# Patient Record
Sex: FEMALE | Race: WHITE | Employment: UNEMPLOYED | URBAN - NONMETROPOLITAN AREA
[De-identification: names, ages, dates, MRNs, and addresses within clinical notes are randomized per-mention and may not be internally consistent; named-entity substitution may affect disease eponyms.]

---

## 2017-01-14 ENCOUNTER — TELEPHONE (OUTPATIENT)
Dept: FAMILY MEDICINE CLINIC | Facility: CLINIC | Age: 42
End: 2017-01-14

## 2017-01-17 RX ORDER — CARVEDILOL 25 MG/1
25 TABLET ORAL 2 TIMES DAILY WITH MEALS
Qty: 180 TABLET | Refills: 0 | Status: SHIPPED | OUTPATIENT
Start: 2017-01-17 | End: 2017-04-17

## 2017-01-17 NOTE — TELEPHONE ENCOUNTER
Fax request received from [address] [place] [address] requesting refill on Carvedilol 25mg. Take 1 tablet by mouth two times daily with meals.      Last office visit 5-17-16 110/80  Last refill 9-6-16 #180

## 2017-02-27 RX ORDER — LEVOTHYROXINE SODIUM 0.15 MG/1
150 TABLET ORAL
Qty: 90 TABLET | Refills: 0 | Status: SHIPPED | OUTPATIENT
Start: 2017-02-27 | End: 2017-05-31

## 2017-02-27 NOTE — TELEPHONE ENCOUNTER
Fax request received from [address] [place] [address] requesting refill Levothyroxine 150mcg.     Last office visit 5-17-16  Last refill 11-28-16 #90  Last TSH 1-6-16

## 2017-03-17 NOTE — TELEPHONE ENCOUNTER
The 2 scripts picked up were Norco and Avinza-   Patient stated that the other 2 had been sent to her pharmacy

## 2017-03-20 DIAGNOSIS — Z51.81 ENCOUNTER FOR THERAPEUTIC DRUG MONITORING: Primary | ICD-10-CM

## 2017-03-20 DIAGNOSIS — F32.5 MAJOR DEPRESSIVE DISORDER, SINGLE EPISODE, IN REMISSION (HCC): ICD-10-CM

## 2017-03-20 DIAGNOSIS — F31.31 BIPOLAR AFFECTIVE DISORDER, CURRENTLY DEPRESSED, MILD (HCC): ICD-10-CM

## 2017-03-21 ENCOUNTER — MED REC SCAN ONLY (OUTPATIENT)
Dept: FAMILY MEDICINE CLINIC | Facility: CLINIC | Age: 42
End: 2017-03-21

## 2017-03-27 NOTE — PROGRESS NOTES
Abigail Gallegos is a 42year old female. HPI:   Last visit 5/2016 with me. Last visit with Dr. Zaida Jackson 6/2016. Has not seen psychiatrist or other specialist. Here per my request for labs and assessment. Feels depression has worsened.  But still  Nanci Zelaya Take 1 tablet (500 mg total) by mouth daily. Disp: 90 tablet Rfl: 0   Tiotropium Bromide Monohydrate (SPIRIVA RESPIMAT) 2.5 MCG/ACT Inhalation Aero Soln Inhale 1 puff into the lungs daily.  Disp: 3 Inhaler Rfl: 1   SUMAtriptan Succinate (IMITREX) 100 MG Ora encourage psych follow up referrla put in    4. Major depressive disorder, single episode, in remission (HCC)  - latuda 90 mg daily  - referred to [facility] - to choices and help with psychitatrist     5.  Chronic obstructive pulmonary disease,

## 2017-03-28 ENCOUNTER — LAB ENCOUNTER (OUTPATIENT)
Dept: LAB | Age: 42
End: 2017-03-28
Attending: FAMILY MEDICINE
Payer: COMMERCIAL

## 2017-03-28 ENCOUNTER — OFFICE VISIT (OUTPATIENT)
Dept: FAMILY MEDICINE CLINIC | Facility: CLINIC | Age: 42
End: 2017-03-28

## 2017-03-28 VITALS
TEMPERATURE: 98 F | BODY MASS INDEX: 32 KG/M2 | SYSTOLIC BLOOD PRESSURE: 120 MMHG | DIASTOLIC BLOOD PRESSURE: 88 MMHG | WEIGHT: 190.5 LBS

## 2017-03-28 DIAGNOSIS — E55.9 VITAMIN D DEFICIENCY: ICD-10-CM

## 2017-03-28 DIAGNOSIS — J45.20 MILD INTERMITTENT ASTHMA WITHOUT COMPLICATION: ICD-10-CM

## 2017-03-28 DIAGNOSIS — F31.31 BIPOLAR AFFECTIVE DISORDER, CURRENTLY DEPRESSED, MILD (HCC): ICD-10-CM

## 2017-03-28 DIAGNOSIS — E03.9 ACQUIRED HYPOTHYROIDISM: ICD-10-CM

## 2017-03-28 DIAGNOSIS — I10 ESSENTIAL HYPERTENSION: ICD-10-CM

## 2017-03-28 DIAGNOSIS — F32.5 MAJOR DEPRESSIVE DISORDER, SINGLE EPISODE, IN REMISSION (HCC): ICD-10-CM

## 2017-03-28 DIAGNOSIS — M54.50 CHRONIC BILATERAL LOW BACK PAIN WITHOUT SCIATICA: ICD-10-CM

## 2017-03-28 DIAGNOSIS — G89.29 CHRONIC BILATERAL LOW BACK PAIN WITHOUT SCIATICA: ICD-10-CM

## 2017-03-28 DIAGNOSIS — Z79.899 ENCOUNTER FOR LONG-TERM (CURRENT) USE OF HIGH-RISK MEDICATION: ICD-10-CM

## 2017-03-28 DIAGNOSIS — M47.816 OSTEOARTHRITIS OF LUMBAR SPINE, UNSPECIFIED SPINAL OSTEOARTHRITIS COMPLICATION STATUS: ICD-10-CM

## 2017-03-28 DIAGNOSIS — F11.20 NARCOTIC DEPENDENCY, CONTINUOUS (HCC): ICD-10-CM

## 2017-03-28 DIAGNOSIS — E87.1 ACUTE HYPONATREMIA: ICD-10-CM

## 2017-03-28 DIAGNOSIS — J44.9 CHRONIC OBSTRUCTIVE PULMONARY DISEASE, UNSPECIFIED COPD TYPE (HCC): ICD-10-CM

## 2017-03-28 DIAGNOSIS — Z51.81 ENCOUNTER FOR THERAPEUTIC DRUG MONITORING: Primary | ICD-10-CM

## 2017-03-28 LAB
25-HYDROXYVITAMIN D (TOTAL): 8.4 NG/ML (ref 30–100)
ALBUMIN SERPL-MCNC: 3.7 G/DL (ref 3.5–4.8)
ALP LIVER SERPL-CCNC: 98 U/L (ref 37–98)
ALT SERPL-CCNC: 17 U/L (ref 14–54)
AST SERPL-CCNC: 11 U/L (ref 15–41)
BASOPHILS # BLD AUTO: 0.03 X10(3) UL (ref 0–0.1)
BASOPHILS NFR BLD AUTO: 0.3 %
BILIRUB SERPL-MCNC: 0.3 MG/DL (ref 0.1–2)
BUN BLD-MCNC: 9 MG/DL (ref 8–20)
CALCIUM BLD-MCNC: 8.8 MG/DL (ref 8.3–10.3)
CHLORIDE: 102 MMOL/L (ref 101–111)
CHOLEST SMN-MCNC: 209 MG/DL (ref ?–200)
CO2: 24 MMOL/L (ref 22–32)
CREAT BLD-MCNC: 0.71 MG/DL (ref 0.55–1.02)
EOSINOPHIL # BLD AUTO: 0.02 X10(3) UL (ref 0–0.3)
EOSINOPHIL NFR BLD AUTO: 0.2 %
ERYTHROCYTE [DISTWIDTH] IN BLOOD BY AUTOMATED COUNT: 12.9 % (ref 11.5–16)
GLUCOSE BLD-MCNC: 127 MG/DL (ref 70–99)
HCT VFR BLD AUTO: 49 % (ref 34–50)
HDLC SERPL-MCNC: 42 MG/DL (ref 45–?)
HDLC SERPL: 4.98 {RATIO} (ref ?–4.44)
HGB BLD-MCNC: 16.3 G/DL (ref 12–16)
IMMATURE GRANULOCYTE COUNT: 0.03 X10(3) UL (ref 0–1)
IMMATURE GRANULOCYTE RATIO %: 0.3 %
LDLC SERPL CALC-MCNC: 133 MG/DL (ref ?–130)
LYMPHOCYTES # BLD AUTO: 0.38 X10(3) UL (ref 0.9–4)
LYMPHOCYTES NFR BLD AUTO: 4.1 %
M PROTEIN MFR SERPL ELPH: 7.3 G/DL (ref 6.1–8.3)
MCH RBC QN AUTO: 31.3 PG (ref 27–33.2)
MCHC RBC AUTO-ENTMCNC: 33.3 G/DL (ref 31–37)
MCV RBC AUTO: 94.2 FL (ref 81–100)
MONOCYTES # BLD AUTO: 0.52 X10(3) UL (ref 0.1–0.6)
MONOCYTES NFR BLD AUTO: 5.7 %
NEUTROPHIL ABS PRELIM: 8.18 X10 (3) UL (ref 1.3–6.7)
NEUTROPHILS # BLD AUTO: 8.18 X10(3) UL (ref 1.3–6.7)
NEUTROPHILS NFR BLD AUTO: 89.4 %
NONHDLC SERPL-MCNC: 167 MG/DL (ref ?–130)
PLATELET # BLD AUTO: 328 10(3)UL (ref 150–450)
POTASSIUM SERPL-SCNC: 4 MMOL/L (ref 3.6–5.1)
RBC # BLD AUTO: 5.2 X10(6)UL (ref 3.8–5.1)
RED CELL DISTRIBUTION WIDTH-SD: 44.1 FL (ref 35.1–46.3)
SODIUM SERPL-SCNC: 135 MMOL/L (ref 136–144)
TRIGLYCERIDES: 170 MG/DL (ref ?–150)
TSI SER-ACNC: 1 MIU/ML (ref 0.35–5.5)
VLDL: 34 MG/DL (ref 5–40)
WBC # BLD AUTO: 9.2 X10(3) UL (ref 4–13)

## 2017-03-28 PROCEDURE — 85025 COMPLETE CBC W/AUTO DIFF WBC: CPT

## 2017-03-28 PROCEDURE — 80061 LIPID PANEL: CPT

## 2017-03-28 PROCEDURE — 80053 COMPREHEN METABOLIC PANEL: CPT

## 2017-03-28 PROCEDURE — 84443 ASSAY THYROID STIM HORMONE: CPT

## 2017-03-28 PROCEDURE — 82306 VITAMIN D 25 HYDROXY: CPT

## 2017-03-28 PROCEDURE — 36415 COLL VENOUS BLD VENIPUNCTURE: CPT

## 2017-03-28 PROCEDURE — 99214 OFFICE O/P EST MOD 30 MIN: CPT | Performed by: FAMILY MEDICINE

## 2017-03-31 DIAGNOSIS — E55.9 VITAMIN D DEFICIENCY: Primary | ICD-10-CM

## 2017-03-31 RX ORDER — ERGOCALCIFEROL 1.25 MG/1
50000 CAPSULE ORAL WEEKLY
Qty: 12 CAPSULE | Refills: 0 | Status: SHIPPED | OUTPATIENT
Start: 2017-03-31 | End: 2017-06-26

## 2017-04-17 RX ORDER — CARVEDILOL 25 MG/1
25 TABLET ORAL 2 TIMES DAILY WITH MEALS
Qty: 180 TABLET | Refills: 0 | Status: SHIPPED | OUTPATIENT
Start: 2017-04-17 | End: 2017-07-13

## 2017-05-31 RX ORDER — LEVOTHYROXINE SODIUM 0.15 MG/1
150 TABLET ORAL
Qty: 90 TABLET | Refills: 1 | Status: SHIPPED | OUTPATIENT
Start: 2017-05-31 | End: 2017-11-22

## 2017-06-26 DIAGNOSIS — E55.9 VITAMIN D DEFICIENCY: ICD-10-CM

## 2017-06-27 DIAGNOSIS — J44.9 CHRONIC OBSTRUCTIVE PULMONARY DISEASE, UNSPECIFIED COPD TYPE (HCC): ICD-10-CM

## 2017-06-27 RX ORDER — ERGOCALCIFEROL 1.25 MG/1
50000 CAPSULE ORAL WEEKLY
Qty: 12 CAPSULE | Refills: 0 | Status: SHIPPED | OUTPATIENT
Start: 2017-06-27 | End: 2017-07-27

## 2017-06-30 ENCOUNTER — OFFICE VISIT (OUTPATIENT)
Dept: FAMILY MEDICINE CLINIC | Facility: CLINIC | Age: 42
End: 2017-06-30

## 2017-06-30 VITALS
BODY MASS INDEX: 31 KG/M2 | TEMPERATURE: 98 F | WEIGHT: 184 LBS | DIASTOLIC BLOOD PRESSURE: 90 MMHG | SYSTOLIC BLOOD PRESSURE: 128 MMHG

## 2017-06-30 DIAGNOSIS — Z51.81 ENCOUNTER FOR THERAPEUTIC DRUG MONITORING: Primary | ICD-10-CM

## 2017-06-30 DIAGNOSIS — E55.9 VITAMIN D DEFICIENCY: ICD-10-CM

## 2017-06-30 PROCEDURE — 99213 OFFICE O/P EST LOW 20 MIN: CPT | Performed by: FAMILY MEDICINE

## 2017-06-30 PROCEDURE — 82306 VITAMIN D 25 HYDROXY: CPT | Performed by: FAMILY MEDICINE

## 2017-06-30 PROCEDURE — 36415 COLL VENOUS BLD VENIPUNCTURE: CPT | Performed by: FAMILY MEDICINE

## 2017-06-30 NOTE — PATIENT INSTRUCTIONS
Vitamin D  Does this test have other names? 25-hydroxyvitamin D (25-high-DROX-ee-VIE-tuh-min D), 25(OH)D  What is this test?  Vitamin D is mainly found in fortified dairy foods, juice, breakfast cereal, and certain fish.  This vitamin plays many roles in A result for a lab test may be affected by many things, including the method the laboratory uses to do the test. If your test results are different from the normal value, you may not have a problem.  To learn what the results mean for you, talk with your he Date Last Reviewed: 10/12/2015  © [id number] [address], [address] [place], [place] [address]. All rights reserved. This information is not intended as a substitute for professional medical care.  Always follow your healthcare Bruno Favorite

## 2017-06-30 NOTE — PROGRESS NOTES
Rosy Almendarez is a 42year old female. HPI:   Here for vit D check    Current Outpatient Prescriptions:  ergocalciferol 84679 units Oral Cap Take 1 capsule (50,000 Units total) by mouth once a week.  Disp: 12 capsule Rfl: 0   Tiotropium Bromide Monohyd asthma, unspecified    • Fibromyalgia    • HEADACHES    • Spondylolysis, lumbar region     L5 grade 1 anterior    • Unspecified essential hypertension    • Unspecified hypothyroidism    • Vitamin D deficiency 3/28/2017      Social History:  Smoking status:

## 2017-07-01 DIAGNOSIS — E55.9 VITAMIN D DEFICIENCY: Primary | ICD-10-CM

## 2017-07-11 DIAGNOSIS — F41.9 ANXIETY: ICD-10-CM

## 2017-07-11 RX ORDER — MORPHINE SULFATE 90 MG/1
90 CAPSULE, EXTENDED RELEASE ORAL 2 TIMES DAILY
Qty: 60 CAPSULE | Refills: 0 | Status: SHIPPED | OUTPATIENT
Start: 2017-07-11 | End: 2017-08-11

## 2017-07-11 RX ORDER — HYDROCODONE BITARTRATE AND ACETAMINOPHEN 10; 325 MG/1; MG/1
TABLET ORAL
Qty: 60 TABLET | Refills: 0 | Status: SHIPPED | OUTPATIENT
Start: 2017-07-11 | End: 2017-08-11

## 2017-07-11 RX ORDER — CARISOPRODOL 350 MG/1
350 TABLET ORAL 4 TIMES DAILY PRN
Qty: 120 TABLET | Refills: 0 | Status: SHIPPED | OUTPATIENT
Start: 2017-07-11 | End: 2017-08-11

## 2017-07-11 RX ORDER — ALPRAZOLAM 1 MG/1
1 TABLET ORAL 2 TIMES DAILY
Qty: 60 TABLET | Refills: 0 | Status: SHIPPED | OUTPATIENT
Start: 2017-07-11 | End: 2017-08-11

## 2017-07-11 NOTE — TELEPHONE ENCOUNTER
YOHAN Hollingsworth      SHE WANTS WRITTEN SCRIPTS FOR ALL 4 OF THESE AND SHE WILL PICK THEM UP ON Gisela Hatch

## 2017-07-13 NOTE — TELEPHONE ENCOUNTER
Received fax from [id number] requesting refill on Carvedilol 25 mg  LOV- 6/30/2017  Last Refill- 4/17/17 #180 no refills

## 2017-07-14 RX ORDER — CARVEDILOL 25 MG/1
25 TABLET ORAL 2 TIMES DAILY WITH MEALS
Qty: 180 TABLET | Refills: 0 | Status: SHIPPED | OUTPATIENT
Start: 2017-07-14 | End: 2017-10-09

## 2017-08-11 DIAGNOSIS — F41.9 ANXIETY: ICD-10-CM

## 2017-08-15 RX ORDER — HYDROCODONE BITARTRATE AND ACETAMINOPHEN 10; 325 MG/1; MG/1
TABLET ORAL
Qty: 60 TABLET | Refills: 0 | Status: SHIPPED | OUTPATIENT
Start: 2017-08-15 | End: 2017-09-12

## 2017-08-15 RX ORDER — ALPRAZOLAM 1 MG/1
1 TABLET ORAL 2 TIMES DAILY
Qty: 60 TABLET | Refills: 0 | Status: SHIPPED | OUTPATIENT
Start: 2017-08-15 | End: 2017-09-12

## 2017-08-15 RX ORDER — MORPHINE SULFATE 90 MG/1
90 CAPSULE, EXTENDED RELEASE ORAL 2 TIMES DAILY
Qty: 60 CAPSULE | Refills: 0 | Status: SHIPPED | OUTPATIENT
Start: 2017-08-15 | End: 2017-09-12

## 2017-08-15 RX ORDER — CARISOPRODOL 350 MG/1
350 TABLET ORAL 4 TIMES DAILY PRN
Qty: 120 TABLET | Refills: 0 | Status: SHIPPED | COMMUNITY
Start: 2017-08-15 | End: 2017-09-12

## 2017-08-22 DIAGNOSIS — J20.9 BRONCHITIS, ACUTE, WITH BRONCHOSPASM: ICD-10-CM

## 2017-09-12 DIAGNOSIS — F41.9 ANXIETY: ICD-10-CM

## 2017-09-12 RX ORDER — ALPRAZOLAM 1 MG/1
1 TABLET ORAL 2 TIMES DAILY
Qty: 60 TABLET | Refills: 0 | Status: SHIPPED | OUTPATIENT
Start: 2017-09-12 | End: 2017-10-10

## 2017-09-12 RX ORDER — HYDROCODONE BITARTRATE AND ACETAMINOPHEN 10; 325 MG/1; MG/1
TABLET ORAL
Qty: 60 TABLET | Refills: 0 | Status: SHIPPED | OUTPATIENT
Start: 2017-09-12 | End: 2017-10-10

## 2017-09-12 RX ORDER — MORPHINE SULFATE 90 MG/1
90 CAPSULE, EXTENDED RELEASE ORAL 2 TIMES DAILY
Qty: 60 CAPSULE | Refills: 0 | Status: SHIPPED | OUTPATIENT
Start: 2017-09-12 | End: 2017-10-10

## 2017-09-12 RX ORDER — CARISOPRODOL 350 MG/1
350 TABLET ORAL 4 TIMES DAILY PRN
Qty: 120 TABLET | Refills: 0 | Status: SHIPPED | OUTPATIENT
Start: 2017-09-12 | End: 2017-10-10

## 2017-09-12 NOTE — TELEPHONE ENCOUNTER
Avinza, norco, soma & xanax, pt picks up hard scripts for all on the 15th, will another doctor fill?

## 2017-09-16 DIAGNOSIS — E55.9 VITAMIN D DEFICIENCY: ICD-10-CM

## 2017-09-16 RX ORDER — ERGOCALCIFEROL 1.25 MG/1
50000 CAPSULE ORAL WEEKLY
Qty: 12 CAPSULE | Refills: 0 | OUTPATIENT
Start: 2017-09-16 | End: 2017-10-16

## 2017-09-16 NOTE — TELEPHONE ENCOUNTER
rx refused. Last result note states: Veronica Lambert your vit D level is good at 35.6.  Take vitamin D3 5000 IU daily we will recheck in 6 months

## 2017-09-28 DIAGNOSIS — Z51.81 ENCOUNTER FOR THERAPEUTIC DRUG MONITORING: ICD-10-CM

## 2017-09-28 DIAGNOSIS — F31.31 BIPOLAR AFFECTIVE DISORDER, CURRENTLY DEPRESSED, MILD (HCC): ICD-10-CM

## 2017-09-28 DIAGNOSIS — F32.5 MAJOR DEPRESSIVE DISORDER, SINGLE EPISODE, IN REMISSION (HCC): ICD-10-CM

## 2017-09-28 NOTE — TELEPHONE ENCOUNTER
Last refilled on 6/20/17 for # 90 with 0 rf. Last seen on 6/30/17. No future appointments. Thank you.

## 2017-10-09 RX ORDER — CARVEDILOL 25 MG/1
25 TABLET ORAL 2 TIMES DAILY WITH MEALS
Qty: 180 TABLET | Refills: 0 | Status: SHIPPED | OUTPATIENT
Start: 2017-10-09 | End: 2018-01-10

## 2017-10-10 DIAGNOSIS — F41.9 ANXIETY: ICD-10-CM

## 2017-10-10 RX ORDER — CARISOPRODOL 350 MG/1
350 TABLET ORAL 4 TIMES DAILY PRN
Qty: 120 TABLET | Refills: 0 | Status: SHIPPED | OUTPATIENT
Start: 2017-10-10 | End: 2017-11-08

## 2017-10-10 RX ORDER — FAMCICLOVIR 500 MG/1
500 TABLET, FILM COATED ORAL DAILY
Qty: 90 TABLET | Refills: 0 | Status: SHIPPED | OUTPATIENT
Start: 2017-10-10 | End: 2018-01-10

## 2017-10-10 RX ORDER — MORPHINE SULFATE 90 MG/1
90 CAPSULE, EXTENDED RELEASE ORAL 2 TIMES DAILY
Qty: 60 CAPSULE | Refills: 0 | Status: SHIPPED | OUTPATIENT
Start: 2017-10-10 | End: 2017-11-08

## 2017-10-10 RX ORDER — HYDROCODONE BITARTRATE AND ACETAMINOPHEN 10; 325 MG/1; MG/1
TABLET ORAL
Qty: 60 TABLET | Refills: 0 | Status: SHIPPED | OUTPATIENT
Start: 2017-10-10 | End: 2017-11-08

## 2017-10-10 RX ORDER — ALPRAZOLAM 1 MG/1
1 TABLET ORAL 2 TIMES DAILY
Qty: 60 TABLET | Refills: 0 | Status: SHIPPED | OUTPATIENT
Start: 2017-10-10 | End: 2017-11-08

## 2017-10-10 NOTE — TELEPHONE ENCOUNTER
Patient requests paper scripts of the following medications    avinza  Soma   Xanax   norco      Would like to  on Friday

## 2017-11-08 DIAGNOSIS — F41.9 ANXIETY: ICD-10-CM

## 2017-11-08 RX ORDER — HYDROCODONE BITARTRATE AND ACETAMINOPHEN 10; 325 MG/1; MG/1
TABLET ORAL
Qty: 60 TABLET | Refills: 0 | Status: SHIPPED | OUTPATIENT
Start: 2017-11-08 | End: 2017-12-11

## 2017-11-08 RX ORDER — ALPRAZOLAM 1 MG/1
1 TABLET ORAL 2 TIMES DAILY
Qty: 60 TABLET | Refills: 0 | Status: SHIPPED | OUTPATIENT
Start: 2017-11-08 | End: 2017-12-11

## 2017-11-08 RX ORDER — MORPHINE SULFATE 90 MG/1
90 CAPSULE, EXTENDED RELEASE ORAL 2 TIMES DAILY
Qty: 60 CAPSULE | Refills: 0 | Status: SHIPPED | OUTPATIENT
Start: 2017-11-08 | End: 2017-12-11

## 2017-11-08 RX ORDER — CARISOPRODOL 350 MG/1
350 TABLET ORAL 4 TIMES DAILY PRN
Qty: 120 TABLET | Refills: 0 | Status: SHIPPED | OUTPATIENT
Start: 2017-11-08 | End: 2017-12-11

## 2017-11-08 NOTE — TELEPHONE ENCOUNTER
Morphine Sulfate ER Beads (AVINZA) 90 MG Oral Capsule SR 24 Hr   Carisoprodol (SOMA) 350 MG Oral Tab  Xanax   HYDROcodone-acetaminophen (NORCO)  MG Oral Tab  Her , Jermaine Walls will  written scripts for all of them Monday.

## 2017-11-22 RX ORDER — LEVOTHYROXINE SODIUM 0.15 MG/1
150 TABLET ORAL
Qty: 90 TABLET | Refills: 1 | Status: SHIPPED | OUTPATIENT
Start: 2017-11-22 | End: 2018-05-24

## 2017-12-11 DIAGNOSIS — F41.9 ANXIETY: ICD-10-CM

## 2017-12-11 NOTE — TELEPHONE ENCOUNTER
Last OV: 6/30/2017  Avinza: 11/8/2017 #60 no Rf  Nutley: 11/8/2017 #60 no RF  Soma: 11/8/2017 #60 no Rf  Alprazolam: 11/8/2017 #60 no RF

## 2017-12-11 NOTE — TELEPHONE ENCOUNTER
REFILLS    FIOR ALMANZAR, SOMA, XANAX   ALL WRITTEN SCRIPTS FOR 30 DAYS  SPOUSE HERBIE WILL  Thursday

## 2017-12-12 RX ORDER — ALPRAZOLAM 1 MG/1
1 TABLET ORAL 2 TIMES DAILY
Qty: 60 TABLET | Refills: 0 | Status: SHIPPED | OUTPATIENT
Start: 2017-12-12 | End: 2018-01-08

## 2017-12-12 RX ORDER — MORPHINE SULFATE 90 MG/1
90 CAPSULE, EXTENDED RELEASE ORAL 2 TIMES DAILY
Qty: 60 CAPSULE | Refills: 0 | Status: SHIPPED | OUTPATIENT
Start: 2017-12-12 | End: 2018-01-08

## 2017-12-12 RX ORDER — CARISOPRODOL 350 MG/1
350 TABLET ORAL 4 TIMES DAILY PRN
Qty: 120 TABLET | Refills: 0 | Status: SHIPPED | OUTPATIENT
Start: 2017-12-12 | End: 2018-01-08

## 2017-12-12 RX ORDER — HYDROCODONE BITARTRATE AND ACETAMINOPHEN 10; 325 MG/1; MG/1
TABLET ORAL
Qty: 60 TABLET | Refills: 0 | Status: SHIPPED | OUTPATIENT
Start: 2017-12-12 | End: 2018-01-08

## 2017-12-26 DIAGNOSIS — F32.5 MAJOR DEPRESSIVE DISORDER, SINGLE EPISODE, IN REMISSION (HCC): ICD-10-CM

## 2017-12-26 DIAGNOSIS — Z51.81 ENCOUNTER FOR THERAPEUTIC DRUG MONITORING: ICD-10-CM

## 2017-12-26 DIAGNOSIS — F31.31 BIPOLAR AFFECTIVE DISORDER, CURRENTLY DEPRESSED, MILD (HCC): ICD-10-CM

## 2018-01-02 DIAGNOSIS — Z51.81 ENCOUNTER FOR THERAPEUTIC DRUG MONITORING: ICD-10-CM

## 2018-01-02 DIAGNOSIS — F31.31 BIPOLAR AFFECTIVE DISORDER, CURRENTLY DEPRESSED, MILD (HCC): ICD-10-CM

## 2018-01-08 DIAGNOSIS — F41.9 ANXIETY: ICD-10-CM

## 2018-01-08 NOTE — TELEPHONE ENCOUNTER
Patient would like written scripts of the following medications  Will  on Friday    avinza    Soma    norco    xanex

## 2018-01-09 RX ORDER — HYDROCODONE BITARTRATE AND ACETAMINOPHEN 10; 325 MG/1; MG/1
TABLET ORAL
Qty: 60 TABLET | Refills: 0 | Status: SHIPPED | OUTPATIENT
Start: 2018-01-12 | End: 2018-02-12

## 2018-01-09 RX ORDER — CARISOPRODOL 350 MG/1
350 TABLET ORAL 4 TIMES DAILY PRN
Qty: 120 TABLET | Refills: 0 | Status: SHIPPED | OUTPATIENT
Start: 2018-01-12 | End: 2018-02-12

## 2018-01-09 RX ORDER — MORPHINE SULFATE 90 MG/1
90 CAPSULE, EXTENDED RELEASE ORAL 2 TIMES DAILY
Qty: 60 CAPSULE | Refills: 0 | Status: SHIPPED | OUTPATIENT
Start: 2018-01-12 | End: 2018-02-12

## 2018-01-09 RX ORDER — ALPRAZOLAM 1 MG/1
1 TABLET ORAL 2 TIMES DAILY
Qty: 60 TABLET | Refills: 0 | Status: SHIPPED | OUTPATIENT
Start: 2018-01-12 | End: 2018-02-12

## 2018-01-10 RX ORDER — CARVEDILOL 25 MG/1
25 TABLET ORAL 2 TIMES DAILY WITH MEALS
Qty: 180 TABLET | Refills: 0 | Status: SHIPPED | OUTPATIENT
Start: 2018-01-10 | End: 2019-01-10

## 2018-01-10 RX ORDER — FAMCICLOVIR 500 MG/1
500 TABLET, FILM COATED ORAL DAILY
Qty: 90 TABLET | Refills: 0 | Status: SHIPPED | OUTPATIENT
Start: 2018-01-10 | End: 2018-11-13

## 2018-02-01 ENCOUNTER — PATIENT OUTREACH (OUTPATIENT)
Dept: FAMILY MEDICINE CLINIC | Facility: CLINIC | Age: 43
End: 2018-02-01

## 2018-02-12 DIAGNOSIS — F41.9 ANXIETY: ICD-10-CM

## 2018-02-12 RX ORDER — MORPHINE SULFATE 90 MG/1
90 CAPSULE, EXTENDED RELEASE ORAL 2 TIMES DAILY
Qty: 60 CAPSULE | Refills: 0 | Status: SHIPPED | OUTPATIENT
Start: 2018-02-12 | End: 2018-03-20

## 2018-02-12 RX ORDER — ALPRAZOLAM 1 MG/1
1 TABLET ORAL 2 TIMES DAILY
Qty: 60 TABLET | Refills: 0 | Status: SHIPPED
Start: 2018-02-12 | End: 2018-03-12

## 2018-02-12 RX ORDER — CARISOPRODOL 350 MG/1
350 TABLET ORAL 4 TIMES DAILY PRN
Qty: 120 TABLET | Refills: 0 | Status: SHIPPED
Start: 2018-02-12 | End: 2018-03-12

## 2018-02-12 RX ORDER — HYDROCODONE BITARTRATE AND ACETAMINOPHEN 10; 325 MG/1; MG/1
TABLET ORAL
Qty: 60 TABLET | Refills: 0 | Status: SHIPPED | OUTPATIENT
Start: 2018-02-12 | End: 2018-03-20

## 2018-02-12 NOTE — TELEPHONE ENCOUNTER
Carisoprodol (SOMA) 350 MG  HYDROcodone-acetaminophen (NORCO)  MG  ALPRAZolam 1 MG  Morphine Sulfate ER Beads (AVINZA) 90 MG      Pt will  all hard copy scripts, would like to  on Thursday when she is here for labs.

## 2018-02-15 ENCOUNTER — NURSE ONLY (OUTPATIENT)
Dept: FAMILY MEDICINE CLINIC | Facility: CLINIC | Age: 43
End: 2018-02-15

## 2018-02-15 DIAGNOSIS — E55.9 VITAMIN D DEFICIENCY: ICD-10-CM

## 2018-02-15 PROCEDURE — 82306 VITAMIN D 25 HYDROXY: CPT | Performed by: FAMILY MEDICINE

## 2018-02-15 PROCEDURE — 36415 COLL VENOUS BLD VENIPUNCTURE: CPT | Performed by: FAMILY MEDICINE

## 2018-02-15 NOTE — PROGRESS NOTES
She's here for Vit D blood test. In our discussion, she tells me she has not been taking the vitamin D for several months. Evelina Alias the test for perhaps a new baseline.

## 2018-02-16 LAB — 25-HYDROXYVITAMIN D (TOTAL): 16.5 NG/ML (ref 30–100)

## 2018-02-17 DIAGNOSIS — R79.89 LOW SERUM VITAMIN D: Primary | ICD-10-CM

## 2018-03-12 DIAGNOSIS — F41.9 ANXIETY: ICD-10-CM

## 2018-03-12 RX ORDER — CARISOPRODOL 350 MG/1
350 TABLET ORAL 4 TIMES DAILY PRN
Qty: 120 TABLET | Refills: 0 | Status: SHIPPED | OUTPATIENT
Start: 2018-03-12 | End: 2018-04-10

## 2018-03-12 RX ORDER — ALPRAZOLAM 1 MG/1
1 TABLET ORAL 2 TIMES DAILY
Qty: 60 TABLET | Refills: 0 | Status: SHIPPED | OUTPATIENT
Start: 2018-03-12 | End: 2018-04-10

## 2018-03-12 NOTE — TELEPHONE ENCOUNTER
Morphine Sulfate ER Beads (AVINZA) 90 MG Oral Capsule     HYDROcodone-acetaminophen ([facility])       patient will  on friday

## 2018-03-12 NOTE — TELEPHONE ENCOUNTER
Carisoprodol Last rf 2/12/18 #120x0  Alprazolam Last rf 2/12/18 #60x0  Last ov 6/30/17  Last labs 3/28/17 re ck in 1 yr    No future appointments.

## 2018-03-19 ENCOUNTER — TELEPHONE (OUTPATIENT)
Dept: FAMILY MEDICINE CLINIC | Facility: CLINIC | Age: 43
End: 2018-03-19

## 2018-03-19 NOTE — TELEPHONE ENCOUNTER
Patient advised that Dr Anam Jensen said that this will have to wait until Dr Jodi Pappas returns tomorrow.

## 2018-03-19 NOTE — TELEPHONE ENCOUNTER
Last refills 2/12/18, patient said she drove an hour here to pick these up. Will someone else fill?  Last Ov 6/30/17

## 2018-03-20 ENCOUNTER — TELEPHONE (OUTPATIENT)
Dept: FAMILY MEDICINE CLINIC | Facility: CLINIC | Age: 43
End: 2018-03-20

## 2018-03-20 RX ORDER — HYDROCODONE BITARTRATE AND ACETAMINOPHEN 10; 325 MG/1; MG/1
TABLET ORAL
Qty: 6 TABLET | Refills: 0 | Status: SHIPPED | OUTPATIENT
Start: 2018-03-20 | End: 2018-05-18

## 2018-03-20 RX ORDER — HYDROCODONE BITARTRATE AND ACETAMINOPHEN 10; 325 MG/1; MG/1
TABLET ORAL
Qty: 60 TABLET | Refills: 0 | OUTPATIENT
Start: 2018-03-20

## 2018-03-20 RX ORDER — MORPHINE SULFATE 90 MG/1
90 CAPSULE, EXTENDED RELEASE ORAL 2 TIMES DAILY
Qty: 6 CAPSULE | Refills: 0 | Status: SHIPPED | OUTPATIENT
Start: 2018-03-20 | End: 2018-05-18

## 2018-03-20 RX ORDER — MORPHINE SULFATE 90 MG/1
90 CAPSULE, EXTENDED RELEASE ORAL 2 TIMES DAILY
Qty: 60 CAPSULE | Refills: 0 | OUTPATIENT
Start: 2018-03-20

## 2018-03-20 RX ORDER — MORPHINE SULFATE 90 MG/1
90 CAPSULE, EXTENDED RELEASE ORAL 2 TIMES DAILY
Qty: 60 CAPSULE | Refills: 0 | Status: SHIPPED | OUTPATIENT
Start: 2018-03-20 | End: 2018-03-20

## 2018-03-20 RX ORDER — HYDROCODONE BITARTRATE AND ACETAMINOPHEN 10; 325 MG/1; MG/1
TABLET ORAL
Qty: 60 TABLET | Refills: 0 | Status: SHIPPED | OUTPATIENT
Start: 2018-03-20 | End: 2018-03-20

## 2018-03-20 NOTE — TELEPHONE ENCOUNTER
Patient advised Rx ready for . Needs to keep Friday's appointment or Dr. Jesús Marcus will no longer write prescriptions for her. Patient verbalizes understanding.

## 2018-03-20 NOTE — TELEPHONE ENCOUNTER
Patient advised cannot have Rx unless seen in office. Needs to be seen Friday. Verbalizes understanding. Appointment scheduled.   Future Appointments  Date Time Provider Gonzalo Dawn   3/23/2018 11:15 AM Kevin Garza DO EMGSW [facility]

## 2018-03-23 ENCOUNTER — LAB ENCOUNTER (OUTPATIENT)
Dept: LAB | Age: 43
End: 2018-03-23
Attending: FAMILY MEDICINE
Payer: COMMERCIAL

## 2018-03-23 ENCOUNTER — OFFICE VISIT (OUTPATIENT)
Dept: FAMILY MEDICINE CLINIC | Facility: CLINIC | Age: 43
End: 2018-03-23

## 2018-03-23 VITALS
SYSTOLIC BLOOD PRESSURE: 122 MMHG | TEMPERATURE: 98 F | WEIGHT: 182 LBS | DIASTOLIC BLOOD PRESSURE: 80 MMHG | HEIGHT: 64.5 IN | OXYGEN SATURATION: 97 % | BODY MASS INDEX: 30.69 KG/M2 | HEART RATE: 74 BPM

## 2018-03-23 DIAGNOSIS — M54.50 CHRONIC BILATERAL LOW BACK PAIN WITHOUT SCIATICA: ICD-10-CM

## 2018-03-23 DIAGNOSIS — Z51.81 ENCOUNTER FOR THERAPEUTIC DRUG MONITORING: ICD-10-CM

## 2018-03-23 DIAGNOSIS — F32.5 MAJOR DEPRESSIVE DISORDER, SINGLE EPISODE, IN REMISSION (HCC): ICD-10-CM

## 2018-03-23 DIAGNOSIS — F11.20 NARCOTIC DEPENDENCY, CONTINUOUS (HCC): ICD-10-CM

## 2018-03-23 DIAGNOSIS — J44.9 CHRONIC OBSTRUCTIVE PULMONARY DISEASE, UNSPECIFIED COPD TYPE (HCC): ICD-10-CM

## 2018-03-23 DIAGNOSIS — Z51.81 ENCOUNTER FOR THERAPEUTIC DRUG MONITORING: Primary | ICD-10-CM

## 2018-03-23 DIAGNOSIS — E03.9 ACQUIRED HYPOTHYROIDISM: ICD-10-CM

## 2018-03-23 DIAGNOSIS — F41.9 ANXIETY: ICD-10-CM

## 2018-03-23 DIAGNOSIS — G89.29 CHRONIC BILATERAL LOW BACK PAIN WITHOUT SCIATICA: ICD-10-CM

## 2018-03-23 DIAGNOSIS — R79.89 LOW SERUM VITAMIN D: ICD-10-CM

## 2018-03-23 DIAGNOSIS — M47.816 OSTEOARTHRITIS OF LUMBAR SPINE, UNSPECIFIED SPINAL OSTEOARTHRITIS COMPLICATION STATUS: ICD-10-CM

## 2018-03-23 DIAGNOSIS — F31.31 BIPOLAR AFFECTIVE DISORDER, CURRENTLY DEPRESSED, MILD (HCC): ICD-10-CM

## 2018-03-23 LAB
25-HYDROXYVITAMIN D (TOTAL): 15.2 NG/ML (ref 30–100)
ALBUMIN SERPL-MCNC: 3.6 G/DL (ref 3.5–4.8)
ALP LIVER SERPL-CCNC: 147 U/L (ref 37–98)
ALT SERPL-CCNC: 15 U/L (ref 14–54)
AST SERPL-CCNC: 15 U/L (ref 15–41)
BASOPHILS # BLD AUTO: 0.03 X10(3) UL (ref 0–0.1)
BASOPHILS NFR BLD AUTO: 0.5 %
BILIRUB SERPL-MCNC: 0.3 MG/DL (ref 0.1–2)
BUN BLD-MCNC: 6 MG/DL (ref 8–20)
CALCIUM BLD-MCNC: 9.2 MG/DL (ref 8.3–10.3)
CHLORIDE: 107 MMOL/L (ref 101–111)
CHOLEST SMN-MCNC: 217 MG/DL (ref ?–200)
CO2: 26 MMOL/L (ref 22–32)
CREAT BLD-MCNC: 0.67 MG/DL (ref 0.55–1.02)
EOSINOPHIL # BLD AUTO: 0.13 X10(3) UL (ref 0–0.3)
EOSINOPHIL NFR BLD AUTO: 2 %
ERYTHROCYTE [DISTWIDTH] IN BLOOD BY AUTOMATED COUNT: 12.9 % (ref 11.5–16)
FREE T4: 1.3 NG/DL (ref 0.9–1.8)
GLUCOSE BLD-MCNC: 108 MG/DL (ref 70–99)
HCT VFR BLD AUTO: 46.2 % (ref 34–50)
HDLC SERPL-MCNC: 39 MG/DL (ref 45–?)
HDLC SERPL: 5.56 {RATIO} (ref ?–4.44)
HGB BLD-MCNC: 15.4 G/DL (ref 12–16)
IMMATURE GRANULOCYTE COUNT: 0.02 X10(3) UL (ref 0–1)
IMMATURE GRANULOCYTE RATIO %: 0.3 %
LDLC SERPL CALC-MCNC: 143 MG/DL (ref ?–130)
LYMPHOCYTES # BLD AUTO: 2 X10(3) UL (ref 0.9–4)
LYMPHOCYTES NFR BLD AUTO: 30.7 %
M PROTEIN MFR SERPL ELPH: 7.1 G/DL (ref 6.1–8.3)
MCH RBC QN AUTO: 31.3 PG (ref 27–33.2)
MCHC RBC AUTO-ENTMCNC: 33.3 G/DL (ref 31–37)
MCV RBC AUTO: 93.9 FL (ref 81–100)
MONOCYTES # BLD AUTO: 0.42 X10(3) UL (ref 0.1–1)
MONOCYTES NFR BLD AUTO: 6.5 %
NEUTROPHIL ABS PRELIM: 3.91 X10 (3) UL (ref 1.3–6.7)
NEUTROPHILS # BLD AUTO: 3.91 X10(3) UL (ref 1.3–6.7)
NEUTROPHILS NFR BLD AUTO: 60 %
NONHDLC SERPL-MCNC: 178 MG/DL (ref ?–130)
PLATELET # BLD AUTO: 332 10(3)UL (ref 150–450)
POTASSIUM SERPL-SCNC: 4.1 MMOL/L (ref 3.6–5.1)
RBC # BLD AUTO: 4.92 X10(6)UL (ref 3.8–5.1)
RED CELL DISTRIBUTION WIDTH-SD: 44.7 FL (ref 35.1–46.3)
SODIUM SERPL-SCNC: 139 MMOL/L (ref 136–144)
TRIGL SERPL-MCNC: 173 MG/DL (ref ?–150)
TSI SER-ACNC: 1.12 MIU/ML (ref 0.35–5.5)
VLDLC SERPL CALC-MCNC: 35 MG/DL (ref 5–40)
WBC # BLD AUTO: 6.5 X10(3) UL (ref 4–13)

## 2018-03-23 PROCEDURE — 82306 VITAMIN D 25 HYDROXY: CPT | Performed by: FAMILY MEDICINE

## 2018-03-23 PROCEDURE — 80061 LIPID PANEL: CPT | Performed by: FAMILY MEDICINE

## 2018-03-23 PROCEDURE — 36415 COLL VENOUS BLD VENIPUNCTURE: CPT | Performed by: FAMILY MEDICINE

## 2018-03-23 PROCEDURE — 99214 OFFICE O/P EST MOD 30 MIN: CPT | Performed by: FAMILY MEDICINE

## 2018-03-23 PROCEDURE — 84439 ASSAY OF FREE THYROXINE: CPT | Performed by: FAMILY MEDICINE

## 2018-03-23 PROCEDURE — 80050 GENERAL HEALTH PANEL: CPT | Performed by: FAMILY MEDICINE

## 2018-03-23 RX ORDER — NALOXONE HYDROCHLORIDE 4 MG/.1ML
4 SPRAY, METERED NASAL AS NEEDED
Qty: 1 EACH | Refills: 1 | Status: SHIPPED | OUTPATIENT
Start: 2018-03-23

## 2018-03-23 RX ORDER — HYDROCODONE BITARTRATE AND ACETAMINOPHEN 10; 325 MG/1; MG/1
TABLET ORAL
Qty: 60 TABLET | Refills: 0 | Status: SHIPPED | OUTPATIENT
Start: 2018-03-23 | End: 2018-04-18

## 2018-03-23 RX ORDER — MORPHINE SULFATE 90 MG/1
90 CAPSULE, EXTENDED RELEASE ORAL 2 TIMES DAILY
Qty: 60 CAPSULE | Refills: 0 | Status: SHIPPED | OUTPATIENT
Start: 2018-03-23 | End: 2018-04-18

## 2018-03-23 NOTE — PATIENT INSTRUCTIONS
Addiction: Your Treatment Options  No single treatment for addiction works for everyone. The treatment that's best for you can depend on many factors.  For many people, treatment may be a combination of medicine, behavior change, therapy, lifestyle change · [employer] and family training ([facility]). This therapy counsels and trains your family. The therapist teaches them how to motivate you to seek or continue treatment.  This therapy also helps your family recognize family situations that may enco

## 2018-03-23 NOTE — PROGRESS NOTES
Ama Preciado is a 36year old female. HPI:   Arthur Wolff is here for follow up on her narcotics and her psychiatric medications. She needs labs and refill of her meds. Has been without avinza and norco for 1 week. Feels awful.  We discussed opiate addici Take 1 tablet (150 mcg total) by mouth before breakfast. Disp: 90 tablet Rfl: 1   SUMAtriptan Succinate (IMITREX) 100 MG Oral Tab 1 po at onset, repeat 2 hours PRN Disp: 12 tablet Rfl: 0   Tiotropium Bromide Monohydrate (SPIRIVA RESPIMAT) 1.25 MCG/ACT [place] clubbing or edema    ASSESSMENT AND PLAN:   1. Encounter for therapeutic drug monitoring  - reviewed meds   - labs ordered    2.  Bipolar affective disorder, currently depressed, mild (HCC)  - latuda 100 mg ( 80 mg and 20 mg)  - encouraged to follow up with

## 2018-03-24 DIAGNOSIS — E55.9 VITAMIN D DEFICIENCY: Primary | ICD-10-CM

## 2018-03-24 DIAGNOSIS — E78.00 HIGH CHOLESTEROL: ICD-10-CM

## 2018-03-24 RX ORDER — SIMVASTATIN 10 MG
10 TABLET ORAL NIGHTLY
Qty: 90 TABLET | Refills: 0 | Status: SHIPPED | OUTPATIENT
Start: 2018-03-24 | End: 2018-06-18

## 2018-03-24 RX ORDER — ERGOCALCIFEROL 1.25 MG/1
50000 CAPSULE ORAL WEEKLY
Qty: 12 CAPSULE | Refills: 0 | Status: SHIPPED | OUTPATIENT
Start: 2018-03-24 | End: 2018-06-22

## 2018-03-26 DIAGNOSIS — J44.9 CHRONIC OBSTRUCTIVE PULMONARY DISEASE, UNSPECIFIED COPD TYPE (HCC): ICD-10-CM

## 2018-03-28 DIAGNOSIS — Z51.81 ENCOUNTER FOR THERAPEUTIC DRUG MONITORING: ICD-10-CM

## 2018-03-28 DIAGNOSIS — F31.31 BIPOLAR AFFECTIVE DISORDER, CURRENTLY DEPRESSED, MILD (HCC): ICD-10-CM

## 2018-03-28 DIAGNOSIS — F32.5 MAJOR DEPRESSIVE DISORDER, SINGLE EPISODE, IN REMISSION (HCC): ICD-10-CM

## 2018-04-10 DIAGNOSIS — F41.9 ANXIETY: ICD-10-CM

## 2018-04-10 RX ORDER — CARISOPRODOL 350 MG/1
350 TABLET ORAL 4 TIMES DAILY PRN
Qty: 120 TABLET | Refills: 0 | Status: SHIPPED
Start: 2018-04-10 | End: 2018-05-01

## 2018-04-10 RX ORDER — ALPRAZOLAM 1 MG/1
1 TABLET ORAL 2 TIMES DAILY
Qty: 60 TABLET | Refills: 0 | Status: SHIPPED | OUTPATIENT
Start: 2018-04-10 | End: 2018-06-01 | Stop reason: ALTCHOICE

## 2018-04-10 NOTE — TELEPHONE ENCOUNTER
Fax request received from [address] [place] [address] requesting refill on Carisoprodol 350mg. Take 1 Tablet p.o. 4 times a day prn muscle spasms.     Last office visit 3-23-18  Last refill 3-12-18 #120

## 2018-04-18 DIAGNOSIS — Z51.81 ENCOUNTER FOR THERAPEUTIC DRUG MONITORING: ICD-10-CM

## 2018-04-18 RX ORDER — HYDROCODONE BITARTRATE AND ACETAMINOPHEN 10; 325 MG/1; MG/1
TABLET ORAL
Qty: 60 TABLET | Refills: 0 | Status: SHIPPED | OUTPATIENT
Start: 2018-04-23 | End: 2018-05-01 | Stop reason: ALTCHOICE

## 2018-04-18 RX ORDER — MORPHINE SULFATE 90 MG/1
90 CAPSULE, EXTENDED RELEASE ORAL 2 TIMES DAILY
Qty: 60 CAPSULE | Refills: 0 | Status: SHIPPED | OUTPATIENT
Start: 2018-04-23 | End: 2018-05-01 | Stop reason: ALTCHOICE

## 2018-04-23 ENCOUNTER — TELEPHONE (OUTPATIENT)
Dept: FAMILY MEDICINE CLINIC | Facility: CLINIC | Age: 43
End: 2018-04-23

## 2018-04-23 NOTE — TELEPHONE ENCOUNTER
STARTING 5/1/18 SHE WILL NO LONGER BE ABLE TO FILL SOMA WITH MUSCLE RELAXER. IT WILL NOT BE ALLOWED TO BE DISPENSED FROM THE PHARMACY TOGETHER. SO PATIENT WANTS TO TALK ABOUT CHANGING MUSCLE RELAXER. PLEASE ADVISE OR NOTIFY IF APPOINTMENT IS NEEDED.

## 2018-04-24 NOTE — TELEPHONE ENCOUNTER
Concepción Moyer needs to be seen. With the opiod restrictions we need to discuss referral to another pain specialist to help with her pain syndrome. We have discussed her current meds are a dangerous concoction. Please have her be seen this Friday.

## 2018-05-01 ENCOUNTER — OFFICE VISIT (OUTPATIENT)
Dept: FAMILY MEDICINE CLINIC | Facility: CLINIC | Age: 43
End: 2018-05-01

## 2018-05-01 VITALS
OXYGEN SATURATION: 98 % | BODY MASS INDEX: 30.53 KG/M2 | SYSTOLIC BLOOD PRESSURE: 130 MMHG | WEIGHT: 183.25 LBS | TEMPERATURE: 96 F | HEIGHT: 65 IN | DIASTOLIC BLOOD PRESSURE: 80 MMHG | HEART RATE: 66 BPM

## 2018-05-01 DIAGNOSIS — M54.9 CHRONIC BACK PAIN, UNSPECIFIED BACK LOCATION, UNSPECIFIED BACK PAIN LATERALITY: ICD-10-CM

## 2018-05-01 DIAGNOSIS — M79.7 FIBROMYALGIA SYNDROME: Primary | ICD-10-CM

## 2018-05-01 DIAGNOSIS — Z12.31 ENCOUNTER FOR SCREENING MAMMOGRAM FOR BREAST CANCER: ICD-10-CM

## 2018-05-01 DIAGNOSIS — G89.29 CHRONIC BACK PAIN, UNSPECIFIED BACK LOCATION, UNSPECIFIED BACK PAIN LATERALITY: ICD-10-CM

## 2018-05-01 PROCEDURE — 99214 OFFICE O/P EST MOD 30 MIN: CPT | Performed by: FAMILY MEDICINE

## 2018-05-01 RX ORDER — BACLOFEN 20 MG/1
20 TABLET ORAL 3 TIMES DAILY
Qty: 90 TABLET | Refills: 0 | Status: SHIPPED | OUTPATIENT
Start: 2018-05-01 | End: 2018-06-01

## 2018-05-01 RX ORDER — BACLOFEN 20 MG/1
20 TABLET ORAL 3 TIMES DAILY
Qty: 90 TABLET | Refills: 0 | Status: SHIPPED | OUTPATIENT
Start: 2018-05-01 | End: 2018-05-01

## 2018-05-01 NOTE — PROGRESS NOTES
Elie Apgar is a 36year old female. HPI:   Samuel Nolasco is here to discuss other options for her back spasms. Pharmacist will no longer filll soma with her other meds norco and morphine. Has been on skelaxin and flexeril with little help.   Has appt in Monohydrate (SPIRIVA RESPIMAT) 1.25 MCG/ACT Inhalation Aero Soln Inhale 2 puffs into the lungs daily.  Disp: 3 Inhaler Rfl: 0      Past Medical History:   Diagnosis Date   • ANXIETY    • Back pain    • bipolar    • Chronic rhinitis    • DEPRESSION    • Depr 3 (three) times daily. Dispense: 90 tablet; Refill: 0  - avinza 90 mg bid ( I am the only prescriber)  - norco 10 mg ( 2 ) daily at noon. 3. Encounter for screening mammogram for breast cancer  - monthly [facility]  - [facility] SCREENING BILJOSELUIS (CPT=[id number]);  Future

## 2018-05-18 RX ORDER — MORPHINE SULFATE 90 MG/1
90 CAPSULE, EXTENDED RELEASE ORAL 2 TIMES DAILY
Qty: 60 CAPSULE | Refills: 0 | Status: SHIPPED | OUTPATIENT
Start: 2018-05-18 | End: 2018-06-22

## 2018-05-18 RX ORDER — HYDROCODONE BITARTRATE AND ACETAMINOPHEN 10; 325 MG/1; MG/1
TABLET ORAL
Qty: 60 TABLET | Refills: 0 | Status: SHIPPED | OUTPATIENT
Start: 2018-05-18 | End: 2018-06-22

## 2018-05-18 NOTE — TELEPHONE ENCOUNTER
Morphine Sulfate ER Beads (AVINZA) 90 MG Oral Capsule SR 24 Hr   And  Norco    Patient or John Ritchie  Will  scripts next week

## 2018-05-18 NOTE — TELEPHONE ENCOUNTER
Last refill #60- on 4/23.   Last office visit on 5/1/18  Future Appointments  Date Time Provider Gonzalo Dawn   6/1/2018 1:45 PM [facility]

## 2018-05-24 ENCOUNTER — TELEPHONE (OUTPATIENT)
Dept: FAMILY MEDICINE CLINIC | Facility: CLINIC | Age: 43
End: 2018-05-24

## 2018-05-24 RX ORDER — LEVOTHYROXINE SODIUM 0.15 MG/1
150 TABLET ORAL
Qty: 90 TABLET | Refills: 2 | Status: SHIPPED | OUTPATIENT
Start: 2018-05-24 | End: 2018-11-12

## 2018-05-24 NOTE — TELEPHONE ENCOUNTER
Last rf 11/22/17 #90x1  Last ov 5/1/18  Last labs 3/23/18    Future Appointments  Date Time Provider Gonzalo Dawn   6/1/2018 1:45 PM Layne Garza, DO [facility]

## 2018-05-31 DIAGNOSIS — M54.9 CHRONIC BACK PAIN, UNSPECIFIED BACK LOCATION, UNSPECIFIED BACK PAIN LATERALITY: ICD-10-CM

## 2018-05-31 DIAGNOSIS — G89.29 CHRONIC BACK PAIN, UNSPECIFIED BACK LOCATION, UNSPECIFIED BACK PAIN LATERALITY: ICD-10-CM

## 2018-05-31 DIAGNOSIS — M79.7 FIBROMYALGIA SYNDROME: ICD-10-CM

## 2018-05-31 PROBLEM — E78.00 HYPERCHOLESTEREMIA: Status: ACTIVE | Noted: 2018-05-31

## 2018-05-31 RX ORDER — BACLOFEN 20 MG/1
20 TABLET ORAL 3 TIMES DAILY
Qty: 90 TABLET | Refills: 0 | Status: CANCELLED | OUTPATIENT
Start: 2018-05-31

## 2018-06-01 ENCOUNTER — OFFICE VISIT (OUTPATIENT)
Dept: FAMILY MEDICINE CLINIC | Facility: CLINIC | Age: 43
End: 2018-06-01

## 2018-06-01 VITALS
DIASTOLIC BLOOD PRESSURE: 80 MMHG | TEMPERATURE: 98 F | BODY MASS INDEX: 30 KG/M2 | HEART RATE: 73 BPM | OXYGEN SATURATION: 97 % | WEIGHT: 178.5 LBS | SYSTOLIC BLOOD PRESSURE: 126 MMHG

## 2018-06-01 DIAGNOSIS — F11.20 NARCOTIC DEPENDENCY, CONTINUOUS (HCC): ICD-10-CM

## 2018-06-01 DIAGNOSIS — M79.7 FIBROMYALGIA SYNDROME: ICD-10-CM

## 2018-06-01 DIAGNOSIS — M54.9 CHRONIC BACK PAIN, UNSPECIFIED BACK LOCATION, UNSPECIFIED BACK PAIN LATERALITY: ICD-10-CM

## 2018-06-01 DIAGNOSIS — Z51.81 ENCOUNTER FOR THERAPEUTIC DRUG MONITORING: Primary | ICD-10-CM

## 2018-06-01 DIAGNOSIS — Z12.31 VISIT FOR SCREENING MAMMOGRAM: ICD-10-CM

## 2018-06-01 DIAGNOSIS — G89.29 CHRONIC BACK PAIN, UNSPECIFIED BACK LOCATION, UNSPECIFIED BACK PAIN LATERALITY: ICD-10-CM

## 2018-06-01 DIAGNOSIS — E78.00 HYPERCHOLESTEREMIA: ICD-10-CM

## 2018-06-01 DIAGNOSIS — F31.31 BIPOLAR AFFECTIVE DISORDER, CURRENTLY DEPRESSED, MILD (HCC): ICD-10-CM

## 2018-06-01 PROCEDURE — 99214 OFFICE O/P EST MOD 30 MIN: CPT | Performed by: FAMILY MEDICINE

## 2018-06-01 RX ORDER — BACLOFEN 20 MG/1
20 TABLET ORAL 3 TIMES DAILY
Qty: 90 TABLET | Refills: 1 | Status: SHIPPED | OUTPATIENT
Start: 2018-06-01 | End: 2018-07-24

## 2018-06-01 NOTE — PROGRESS NOTES
Angelica Jim is a 36year old female. HPI:   Germaniabaljinder Kipon is here to assess response ot baclofen for muscle spasm. Doing well with current pain meds. was referred to [facility] clinic in [place] to help get off opiated.  Was told would switch to opana and do inje Soln Inhale 2 puffs into the lungs daily. Disp: 3 Inhaler Rfl: 0   baclofen 20 MG Oral Tab Take 1 tablet (20 mg total) by mouth 3 (three) times daily.  Disp: 90 tablet Rfl: 1      Past Medical History:   Diagnosis Date   • ANXIETY    • Back pain    • bipola Dr. Marilee Canas but does not want to change meds. Implied same treatment as she had with Dr. Matthew Du    4. Fibromyalgia syndrome  - baclofen 20  Mg tid  - exercise  - msh oxide 400 mg nightly    5.  Narcotic dependency, continuous (HCC)  - naloxone availabl

## 2018-06-01 NOTE — TELEPHONE ENCOUNTER
Last refill #90 on 5/4/18  Last office visit today.   Future Appointments  Date Time Provider Gonzalo Dawn   7/2/2018 2:00 PM REF EM[facility] REF EMGSFP Ref Lab Jonathan Gilliam   '

## 2018-06-01 NOTE — PATIENT INSTRUCTIONS
Understanding Bipolar Disorder  Bipolar disorder is a serious disorder of the brain. It may severely disrupt your life. At times, it may cause you and your loved ones great pain. But there is hope.  Although there is no cure, treatment can help control yo In depressive episodes, you feel intense sadness and depression. You may also feel worthless, tired, and helpless. Even the things you value most don’t give you pleasure. At times you may want to die. You may even think about taking your own life.   Date [place]

## 2018-06-18 RX ORDER — SIMVASTATIN 10 MG
TABLET ORAL
Qty: 90 TABLET | Refills: 0 | Status: SHIPPED | OUTPATIENT
Start: 2018-06-18 | End: 2018-09-16

## 2018-06-18 NOTE — TELEPHONE ENCOUNTER
Labs are due this month  Future Appointments  Date Time Provider Gonzalo Dawn   7/2/2018 2:00 PM REF EM[facility] REF [facility] Ref Lab [facility]

## 2018-06-22 ENCOUNTER — TELEPHONE (OUTPATIENT)
Dept: FAMILY MEDICINE CLINIC | Facility: CLINIC | Age: 43
End: 2018-06-22

## 2018-06-22 DIAGNOSIS — G89.29 CHRONIC BACK PAIN, UNSPECIFIED BACK LOCATION, UNSPECIFIED BACK PAIN LATERALITY: Primary | ICD-10-CM

## 2018-06-22 DIAGNOSIS — M54.9 CHRONIC BACK PAIN, UNSPECIFIED BACK LOCATION, UNSPECIFIED BACK PAIN LATERALITY: Primary | ICD-10-CM

## 2018-06-22 RX ORDER — MORPHINE SULFATE 90 MG/1
90 CAPSULE, EXTENDED RELEASE ORAL 2 TIMES DAILY
Qty: 60 CAPSULE | Refills: 0 | Status: SHIPPED | OUTPATIENT
Start: 2018-06-22 | End: 2018-07-18

## 2018-06-22 RX ORDER — HYDROCODONE BITARTRATE AND ACETAMINOPHEN 10; 325 MG/1; MG/1
TABLET ORAL
Qty: 60 TABLET | Refills: 0 | Status: SHIPPED | OUTPATIENT
Start: 2018-06-22 | End: 2018-07-18

## 2018-06-22 NOTE — TELEPHONE ENCOUNTER
Last office visit:  6/1/2018  Last refill for Morphine Sulfate ER Beads: 5/18/2018  Last refill for Hydrocodone-Acetaminophen: 5/18/2018      Future Appointments  Date Time Provider Gonzalo Dawn   7/2/2018 2:00 PM REF EM[facility] REF [facility] Ref [place]

## 2018-06-22 NOTE — TELEPHONE ENCOUNTER
Morphine Sulfate ER Beads (AVINZA) 90 MG Oral Capsule SR 24 Hr   HYDROcodone-acetaminophen (NORCO)  MG Oral Tab   RODOLFO OR JACQUELINE ZAMORA WILL  WHEN READY.

## 2018-06-24 DIAGNOSIS — J20.9 BRONCHITIS, ACUTE, WITH BRONCHOSPASM: ICD-10-CM

## 2018-06-25 NOTE — TELEPHONE ENCOUNTER
Last office visit: 6/01/2018    Last refill: 8/22/2017    Pending Prescriptions Disp Refills    PROAIR  (90 Base) MCG/ACT Inhalation Aero Soln [Pharmacy Med Name: PROAIR HFA 90 MCG INHALER] 8.5 Inhaler 0     Sig: INHALE 2 PUFFS EVERY 6 HOURS AS NEED

## 2018-06-26 DIAGNOSIS — F32.5 MAJOR DEPRESSIVE DISORDER, SINGLE EPISODE, IN REMISSION (HCC): ICD-10-CM

## 2018-06-26 DIAGNOSIS — F31.31 BIPOLAR AFFECTIVE DISORDER, CURRENTLY DEPRESSED, MILD (HCC): ICD-10-CM

## 2018-06-26 DIAGNOSIS — Z51.81 ENCOUNTER FOR THERAPEUTIC DRUG MONITORING: ICD-10-CM

## 2018-06-26 RX ORDER — LURASIDONE HYDROCHLORIDE 80 MG/1
TABLET, FILM COATED ORAL
Qty: 90 TABLET | Refills: 0 | Status: SHIPPED | OUTPATIENT
Start: 2018-06-26 | End: 2019-01-01

## 2018-06-26 RX ORDER — LURASIDONE HYDROCHLORIDE 20 MG/1
TABLET, FILM COATED ORAL
Qty: 90 TABLET | Refills: 0 | Status: SHIPPED | OUTPATIENT
Start: 2018-06-26 | End: 2018-09-19

## 2018-06-26 NOTE — TELEPHONE ENCOUNTER
Last refill #90 on 3/28  Last office visit on 6/1/18  Future Appointments  Date Time Provider Gonzalo Dawn   7/2/2018 2:00 PM REF EMG [facility] REF [facility] Ref Lab [facility]

## 2018-06-28 DIAGNOSIS — F32.5 MAJOR DEPRESSIVE DISORDER, SINGLE EPISODE, IN REMISSION (HCC): ICD-10-CM

## 2018-06-28 DIAGNOSIS — Z51.81 ENCOUNTER FOR THERAPEUTIC DRUG MONITORING: ICD-10-CM

## 2018-06-28 DIAGNOSIS — F31.31 BIPOLAR AFFECTIVE DISORDER, CURRENTLY DEPRESSED, MILD (HCC): ICD-10-CM

## 2018-06-29 RX ORDER — LURASIDONE HYDROCHLORIDE 80 MG/1
TABLET, FILM COATED ORAL
Qty: 90 TABLET | Refills: 0 | Status: SHIPPED | OUTPATIENT
Start: 2018-06-29 | End: 2019-01-03

## 2018-07-02 ENCOUNTER — APPOINTMENT (OUTPATIENT)
Dept: LAB | Age: 43
End: 2018-07-02
Attending: FAMILY MEDICINE
Payer: COMMERCIAL

## 2018-07-02 DIAGNOSIS — E78.00 HIGH CHOLESTEROL: ICD-10-CM

## 2018-07-02 DIAGNOSIS — E55.9 VITAMIN D DEFICIENCY: ICD-10-CM

## 2018-07-02 PROCEDURE — 36415 COLL VENOUS BLD VENIPUNCTURE: CPT | Performed by: FAMILY MEDICINE

## 2018-07-02 PROCEDURE — 80061 LIPID PANEL: CPT | Performed by: FAMILY MEDICINE

## 2018-07-02 PROCEDURE — 82306 VITAMIN D 25 HYDROXY: CPT | Performed by: FAMILY MEDICINE

## 2018-07-03 DIAGNOSIS — E78.00 HYPERCHOLESTEREMIA: ICD-10-CM

## 2018-07-03 DIAGNOSIS — E55.9 VITAMIN D DEFICIENCY: Primary | ICD-10-CM

## 2018-07-03 LAB
25-HYDROXYVITAMIN D (TOTAL): 30.8 NG/ML (ref 30–100)
CHOLEST SMN-MCNC: 158 MG/DL (ref ?–200)
HDLC SERPL-MCNC: 47 MG/DL (ref 45–?)
HDLC SERPL: 3.36 {RATIO} (ref ?–4.44)
LDLC SERPL CALC-MCNC: 95 MG/DL (ref ?–130)
NONHDLC SERPL-MCNC: 111 MG/DL (ref ?–130)
TRIGL SERPL-MCNC: 79 MG/DL (ref ?–150)
VLDLC SERPL CALC-MCNC: 16 MG/DL (ref 5–40)

## 2018-07-18 ENCOUNTER — TELEPHONE (OUTPATIENT)
Dept: FAMILY MEDICINE CLINIC | Facility: CLINIC | Age: 43
End: 2018-07-18

## 2018-07-18 DIAGNOSIS — G89.29 CHRONIC BACK PAIN, UNSPECIFIED BACK LOCATION, UNSPECIFIED BACK PAIN LATERALITY: ICD-10-CM

## 2018-07-18 DIAGNOSIS — M54.9 CHRONIC BACK PAIN, UNSPECIFIED BACK LOCATION, UNSPECIFIED BACK PAIN LATERALITY: ICD-10-CM

## 2018-07-18 RX ORDER — MORPHINE SULFATE 90 MG/1
90 CAPSULE, EXTENDED RELEASE ORAL 2 TIMES DAILY
Qty: 60 CAPSULE | Refills: 0 | Status: SHIPPED | OUTPATIENT
Start: 2018-07-18 | End: 2018-08-20

## 2018-07-18 RX ORDER — HYDROCODONE BITARTRATE AND ACETAMINOPHEN 10; 325 MG/1; MG/1
TABLET ORAL
Qty: 60 TABLET | Refills: 0 | Status: SHIPPED | OUTPATIENT
Start: 2018-07-18 | End: 2018-08-20

## 2018-07-18 NOTE — TELEPHONE ENCOUNTER
Needs Avinza and [place]-would like to  on Monday. Her daughter, Brittany Lazo will pick them up on Monday. Call her only if there is a problem.

## 2018-07-24 DIAGNOSIS — M54.9 CHRONIC BACK PAIN, UNSPECIFIED BACK LOCATION, UNSPECIFIED BACK PAIN LATERALITY: ICD-10-CM

## 2018-07-24 DIAGNOSIS — G89.29 CHRONIC BACK PAIN, UNSPECIFIED BACK LOCATION, UNSPECIFIED BACK PAIN LATERALITY: ICD-10-CM

## 2018-07-24 DIAGNOSIS — M79.7 FIBROMYALGIA SYNDROME: ICD-10-CM

## 2018-07-24 RX ORDER — BACLOFEN 20 MG/1
TABLET ORAL
Qty: 90 TABLET | Refills: 1 | Status: SHIPPED | OUTPATIENT
Start: 2018-07-24 | End: 2018-09-17

## 2018-07-27 ENCOUNTER — TELEPHONE (OUTPATIENT)
Dept: FAMILY MEDICINE CLINIC | Facility: CLINIC | Age: 43
End: 2018-07-27

## 2018-07-27 NOTE — TELEPHONE ENCOUNTER
Morphine Sulfate ER Beads (AVINZA) 90 MG Oral Capsule SR 24 Hr ----PLEASE CALL PHARMACY WITH DX FOR THIS MEDICATION.

## 2018-08-20 ENCOUNTER — TELEPHONE (OUTPATIENT)
Dept: FAMILY MEDICINE CLINIC | Facility: CLINIC | Age: 43
End: 2018-08-20

## 2018-08-20 DIAGNOSIS — M54.9 CHRONIC BACK PAIN, UNSPECIFIED BACK LOCATION, UNSPECIFIED BACK PAIN LATERALITY: ICD-10-CM

## 2018-08-20 DIAGNOSIS — G89.29 CHRONIC BACK PAIN, UNSPECIFIED BACK LOCATION, UNSPECIFIED BACK PAIN LATERALITY: ICD-10-CM

## 2018-08-20 NOTE — TELEPHONE ENCOUNTER
NEED SCRIPTS FOR YOHAN & [address], HER DAUGHTER JACQUELINE ZAMORA WILL  WEDS 8/22

## 2018-08-21 RX ORDER — MORPHINE SULFATE 90 MG/1
90 CAPSULE, EXTENDED RELEASE ORAL 2 TIMES DAILY
Qty: 60 CAPSULE | Refills: 0 | Status: SHIPPED | OUTPATIENT
Start: 2018-08-21 | End: 2018-09-18

## 2018-08-21 RX ORDER — HYDROCODONE BITARTRATE AND ACETAMINOPHEN 10; 325 MG/1; MG/1
TABLET ORAL
Qty: 60 TABLET | Refills: 0 | Status: SHIPPED | OUTPATIENT
Start: 2018-08-21 | End: 2018-09-18

## 2018-08-30 DIAGNOSIS — J20.9 BRONCHITIS, ACUTE, WITH BRONCHOSPASM: ICD-10-CM

## 2018-08-30 NOTE — TELEPHONE ENCOUNTER
LOV- 6/1/2018   Last refill- 6/25/2018 8.5g with no refills  Last AAP/ACT: 3/27/2017   Medication pending in encounter. No future appointments.

## 2018-08-31 RX ORDER — ALBUTEROL SULFATE 90 UG/1
AEROSOL, METERED RESPIRATORY (INHALATION)
Qty: 8.5 INHALER | Refills: 0 | Status: SHIPPED | OUTPATIENT
Start: 2018-08-31 | End: 2018-09-24

## 2018-09-17 DIAGNOSIS — M54.9 CHRONIC BACK PAIN, UNSPECIFIED BACK LOCATION, UNSPECIFIED BACK PAIN LATERALITY: ICD-10-CM

## 2018-09-17 DIAGNOSIS — G89.29 CHRONIC BACK PAIN, UNSPECIFIED BACK LOCATION, UNSPECIFIED BACK PAIN LATERALITY: ICD-10-CM

## 2018-09-17 DIAGNOSIS — M79.7 FIBROMYALGIA SYNDROME: ICD-10-CM

## 2018-09-17 RX ORDER — SIMVASTATIN 10 MG
TABLET ORAL
Qty: 90 TABLET | Refills: 0 | Status: SHIPPED | OUTPATIENT
Start: 2018-09-17 | End: 2018-12-10

## 2018-09-17 RX ORDER — BACLOFEN 20 MG/1
TABLET ORAL
Qty: 90 TABLET | Refills: 1 | Status: SHIPPED | OUTPATIENT
Start: 2018-09-17 | End: 2018-11-07

## 2018-09-17 NOTE — TELEPHONE ENCOUNTER
Last office visit: 6/01/2018    Last Lipid: 7/02/2018    Last refill: 6/18/2018    Requested Prescriptions     Pending Prescriptions Disp Refills   • SIMVASTATIN 10 MG Oral Tab [Pharmacy Med Name: SIMVASTATIN 10 MG TABLET] 90 tablet 0     Sig: TAKE 1 TABLE

## 2018-09-18 DIAGNOSIS — G89.29 CHRONIC BACK PAIN, UNSPECIFIED BACK LOCATION, UNSPECIFIED BACK PAIN LATERALITY: ICD-10-CM

## 2018-09-18 DIAGNOSIS — M54.9 CHRONIC BACK PAIN, UNSPECIFIED BACK LOCATION, UNSPECIFIED BACK PAIN LATERALITY: ICD-10-CM

## 2018-09-18 RX ORDER — MORPHINE SULFATE 90 MG/1
90 CAPSULE, EXTENDED RELEASE ORAL 2 TIMES DAILY
Qty: 60 CAPSULE | Refills: 0 | Status: SHIPPED | OUTPATIENT
Start: 2018-09-18 | End: 2018-10-23

## 2018-09-18 RX ORDER — HYDROCODONE BITARTRATE AND ACETAMINOPHEN 10; 325 MG/1; MG/1
TABLET ORAL
Qty: 60 TABLET | Refills: 0 | Status: SHIPPED | OUTPATIENT
Start: 2018-09-18 | End: 2018-10-23

## 2018-09-19 DIAGNOSIS — Z51.81 ENCOUNTER FOR THERAPEUTIC DRUG MONITORING: ICD-10-CM

## 2018-09-19 DIAGNOSIS — F31.31 BIPOLAR AFFECTIVE DISORDER, CURRENTLY DEPRESSED, MILD (HCC): ICD-10-CM

## 2018-09-19 RX ORDER — LURASIDONE HYDROCHLORIDE 20 MG/1
TABLET, FILM COATED ORAL
Qty: 90 TABLET | Refills: 0 | Status: SHIPPED | OUTPATIENT
Start: 2018-09-19 | End: 2018-12-19

## 2018-09-24 DIAGNOSIS — J20.9 BRONCHITIS, ACUTE, WITH BRONCHOSPASM: ICD-10-CM

## 2018-09-24 RX ORDER — ALBUTEROL SULFATE 90 UG/1
AEROSOL, METERED RESPIRATORY (INHALATION)
Qty: 8.5 INHALER | Refills: 0 | Status: SHIPPED | OUTPATIENT
Start: 2018-09-24 | End: 2018-10-17

## 2018-10-03 RX ORDER — FAMCICLOVIR 500 MG/1
500 TABLET, FILM COATED ORAL DAILY
Qty: 90 TABLET | Refills: 0 | Status: SHIPPED | OUTPATIENT
Start: 2018-10-03 | End: 2019-01-01

## 2018-10-17 DIAGNOSIS — J20.9 BRONCHITIS, ACUTE, WITH BRONCHOSPASM: ICD-10-CM

## 2018-10-17 RX ORDER — ALBUTEROL SULFATE 90 UG/1
AEROSOL, METERED RESPIRATORY (INHALATION)
Qty: 8.5 INHALER | Refills: 0 | Status: SHIPPED | OUTPATIENT
Start: 2018-10-17 | End: 2018-11-13

## 2018-10-17 NOTE — TELEPHONE ENCOUNTER
Last office visit: 6/01/2018    Last refill: 9/24/2018    Requested Prescriptions     Pending Prescriptions Disp Refills   • Albuterol Sulfate  (90 Base) MCG/ACT Inhalation Aero Soln [Pharmacy Med Name: PROAIR HFA 90 MCG INHALER] 8.5 Inhaler 0     S

## 2018-10-23 ENCOUNTER — TELEPHONE (OUTPATIENT)
Dept: FAMILY MEDICINE CLINIC | Facility: CLINIC | Age: 43
End: 2018-10-23

## 2018-10-23 DIAGNOSIS — M54.9 CHRONIC BACK PAIN, UNSPECIFIED BACK LOCATION, UNSPECIFIED BACK PAIN LATERALITY: ICD-10-CM

## 2018-10-23 DIAGNOSIS — G89.29 CHRONIC BACK PAIN, UNSPECIFIED BACK LOCATION, UNSPECIFIED BACK PAIN LATERALITY: ICD-10-CM

## 2018-10-23 RX ORDER — MORPHINE SULFATE 90 MG/1
90 CAPSULE, EXTENDED RELEASE ORAL 2 TIMES DAILY
Qty: 60 CAPSULE | Refills: 0 | Status: SHIPPED | OUTPATIENT
Start: 2018-10-23 | End: 2018-11-20

## 2018-10-23 RX ORDER — HYDROCODONE BITARTRATE AND ACETAMINOPHEN 10; 325 MG/1; MG/1
TABLET ORAL
Qty: 60 TABLET | Refills: 0 | Status: SHIPPED | OUTPATIENT
Start: 2018-10-23 | End: 2018-11-20

## 2018-11-07 DIAGNOSIS — M79.7 FIBROMYALGIA SYNDROME: ICD-10-CM

## 2018-11-07 DIAGNOSIS — G89.29 CHRONIC BACK PAIN, UNSPECIFIED BACK LOCATION, UNSPECIFIED BACK PAIN LATERALITY: ICD-10-CM

## 2018-11-07 DIAGNOSIS — M54.9 CHRONIC BACK PAIN, UNSPECIFIED BACK LOCATION, UNSPECIFIED BACK PAIN LATERALITY: ICD-10-CM

## 2018-11-07 RX ORDER — BACLOFEN 20 MG/1
20 TABLET ORAL 3 TIMES DAILY
Qty: 270 TABLET | Refills: 1 | Status: SHIPPED | OUTPATIENT
Start: 2018-11-07 | End: 2019-02-05

## 2018-11-12 ENCOUNTER — TELEPHONE (OUTPATIENT)
Dept: FAMILY MEDICINE CLINIC | Facility: CLINIC | Age: 43
End: 2018-11-12

## 2018-11-12 RX ORDER — LEVOTHYROXINE SODIUM 0.15 MG/1
150 TABLET ORAL
Qty: 90 TABLET | Refills: 1 | Status: SHIPPED | OUTPATIENT
Start: 2018-11-12 | End: 2019-01-01

## 2018-11-13 DIAGNOSIS — J44.9 CHRONIC OBSTRUCTIVE PULMONARY DISEASE, UNSPECIFIED COPD TYPE (HCC): ICD-10-CM

## 2018-11-13 DIAGNOSIS — E55.9 VITAMIN D DEFICIENCY: Primary | ICD-10-CM

## 2018-11-13 DIAGNOSIS — E78.00 HYPERCHOLESTEREMIA: ICD-10-CM

## 2018-11-13 DIAGNOSIS — Z51.81 ENCOUNTER FOR MEDICATION MONITORING: ICD-10-CM

## 2018-11-13 DIAGNOSIS — J20.9 BRONCHITIS, ACUTE, WITH BRONCHOSPASM: ICD-10-CM

## 2018-11-13 RX ORDER — TIOTROPIUM BROMIDE INHALATION SPRAY 3.12 UG/1
SPRAY, METERED RESPIRATORY (INHALATION)
Qty: 1 INHALER | Refills: 1 | Status: SHIPPED | OUTPATIENT
Start: 2018-11-13 | End: 2019-01-07

## 2018-11-13 RX ORDER — FAMCICLOVIR 500 MG/1
500 TABLET, FILM COATED ORAL DAILY
Qty: 90 TABLET | Refills: 0 | OUTPATIENT
Start: 2018-11-13

## 2018-11-13 RX ORDER — ALBUTEROL SULFATE 90 UG/1
AEROSOL, METERED RESPIRATORY (INHALATION)
Qty: 8.5 INHALER | Refills: 0 | Status: SHIPPED | OUTPATIENT
Start: 2018-11-13 | End: 2019-01-01

## 2018-11-19 ENCOUNTER — TELEPHONE (OUTPATIENT)
Dept: FAMILY MEDICINE CLINIC | Facility: CLINIC | Age: 43
End: 2018-11-19

## 2018-11-19 DIAGNOSIS — M54.9 CHRONIC BACK PAIN, UNSPECIFIED BACK LOCATION, UNSPECIFIED BACK PAIN LATERALITY: ICD-10-CM

## 2018-11-19 DIAGNOSIS — G89.29 CHRONIC BACK PAIN, UNSPECIFIED BACK LOCATION, UNSPECIFIED BACK PAIN LATERALITY: ICD-10-CM

## 2018-11-19 NOTE — TELEPHONE ENCOUNTER
Morphine Sulfate ER Beads (AVINZA) 90 MG Oral Capsule SR 24   HYDROcodone-acetaminophen (NORCO)  MG Oral Tab   PT. WILL  OR DAUGHTER JACQUELINE WILL .  CALL WHEN READY

## 2018-11-20 RX ORDER — MORPHINE SULFATE 90 MG/1
90 CAPSULE, EXTENDED RELEASE ORAL 2 TIMES DAILY
Qty: 60 CAPSULE | Refills: 0 | Status: SHIPPED | OUTPATIENT
Start: 2018-11-20 | End: 2018-12-14

## 2018-11-20 RX ORDER — HYDROCODONE BITARTRATE AND ACETAMINOPHEN 10; 325 MG/1; MG/1
TABLET ORAL
Qty: 60 TABLET | Refills: 0 | Status: SHIPPED | OUTPATIENT
Start: 2018-11-20 | End: 2018-12-14

## 2018-11-20 NOTE — TELEPHONE ENCOUNTER
I tried to call the pt x2 to let her know the scripts are up front for pick-up. It sounded like someone was answering but then no one would answer.  jm

## 2018-12-10 ENCOUNTER — TELEPHONE (OUTPATIENT)
Dept: FAMILY MEDICINE CLINIC | Facility: CLINIC | Age: 43
End: 2018-12-10

## 2018-12-10 RX ORDER — SIMVASTATIN 10 MG
TABLET ORAL
Qty: 90 TABLET | Refills: 0 | Status: SHIPPED | OUTPATIENT
Start: 2018-12-10 | End: 2019-03-08

## 2018-12-14 ENCOUNTER — TELEPHONE (OUTPATIENT)
Dept: FAMILY MEDICINE CLINIC | Facility: CLINIC | Age: 43
End: 2018-12-14

## 2018-12-14 DIAGNOSIS — G89.29 CHRONIC BACK PAIN, UNSPECIFIED BACK LOCATION, UNSPECIFIED BACK PAIN LATERALITY: ICD-10-CM

## 2018-12-14 DIAGNOSIS — M54.9 CHRONIC BACK PAIN, UNSPECIFIED BACK LOCATION, UNSPECIFIED BACK PAIN LATERALITY: ICD-10-CM

## 2018-12-14 RX ORDER — MORPHINE SULFATE 90 MG/1
90 CAPSULE, EXTENDED RELEASE ORAL 2 TIMES DAILY
Qty: 60 CAPSULE | Refills: 0 | Status: SHIPPED | OUTPATIENT
Start: 2018-12-14 | End: 2019-01-23

## 2018-12-14 RX ORDER — HYDROCODONE BITARTRATE AND ACETAMINOPHEN 10; 325 MG/1; MG/1
TABLET ORAL
Qty: 60 TABLET | Refills: 0 | Status: SHIPPED | OUTPATIENT
Start: 2018-12-14 | End: 2019-01-23

## 2018-12-19 DIAGNOSIS — Z51.81 ENCOUNTER FOR THERAPEUTIC DRUG MONITORING: ICD-10-CM

## 2018-12-19 DIAGNOSIS — F31.31 BIPOLAR AFFECTIVE DISORDER, CURRENTLY DEPRESSED, MILD (HCC): ICD-10-CM

## 2018-12-19 RX ORDER — LURASIDONE HYDROCHLORIDE 20 MG/1
TABLET, FILM COATED ORAL
Qty: 90 TABLET | Refills: 0 | Status: SHIPPED | OUTPATIENT
Start: 2018-12-19 | End: 2019-01-01

## 2019-01-01 ENCOUNTER — TELEPHONE (OUTPATIENT)
Dept: FAMILY MEDICINE CLINIC | Facility: CLINIC | Age: 44
End: 2019-01-01

## 2019-01-01 ENCOUNTER — LAB ENCOUNTER (OUTPATIENT)
Dept: LAB | Age: 44
End: 2019-01-01
Attending: FAMILY MEDICINE
Payer: COMMERCIAL

## 2019-01-01 ENCOUNTER — OFFICE VISIT (OUTPATIENT)
Dept: FAMILY MEDICINE CLINIC | Facility: CLINIC | Age: 44
End: 2019-01-01
Payer: COMMERCIAL

## 2019-01-01 ENCOUNTER — MED REC SCAN ONLY (OUTPATIENT)
Dept: FAMILY MEDICINE CLINIC | Facility: CLINIC | Age: 44
End: 2019-01-01

## 2019-01-01 ENCOUNTER — PATIENT OUTREACH (OUTPATIENT)
Dept: FAMILY MEDICINE CLINIC | Facility: CLINIC | Age: 44
End: 2019-01-01

## 2019-01-01 VITALS
WEIGHT: 176 LBS | HEART RATE: 83 BPM | BODY MASS INDEX: 30.05 KG/M2 | RESPIRATION RATE: 18 BRPM | SYSTOLIC BLOOD PRESSURE: 134 MMHG | DIASTOLIC BLOOD PRESSURE: 70 MMHG | TEMPERATURE: 98 F | HEIGHT: 64 IN | OXYGEN SATURATION: 98 %

## 2019-01-01 DIAGNOSIS — Z51.81 ENCOUNTER FOR THERAPEUTIC DRUG MONITORING: Primary | ICD-10-CM

## 2019-01-01 DIAGNOSIS — F41.9 ANXIETY: ICD-10-CM

## 2019-01-01 DIAGNOSIS — G89.29 CHRONIC BACK PAIN, UNSPECIFIED BACK LOCATION, UNSPECIFIED BACK PAIN LATERALITY: ICD-10-CM

## 2019-01-01 DIAGNOSIS — E78.00 HYPERCHOLESTEREMIA: ICD-10-CM

## 2019-01-01 DIAGNOSIS — J44.9 CHRONIC OBSTRUCTIVE PULMONARY DISEASE, UNSPECIFIED COPD TYPE (HCC): ICD-10-CM

## 2019-01-01 DIAGNOSIS — J20.9 BRONCHITIS, ACUTE, WITH BRONCHOSPASM: ICD-10-CM

## 2019-01-01 DIAGNOSIS — M54.9 CHRONIC BACK PAIN, UNSPECIFIED BACK LOCATION, UNSPECIFIED BACK PAIN LATERALITY: ICD-10-CM

## 2019-01-01 DIAGNOSIS — E03.9 ACQUIRED HYPOTHYROIDISM: ICD-10-CM

## 2019-01-01 DIAGNOSIS — Z12.31 VISIT FOR SCREENING MAMMOGRAM: ICD-10-CM

## 2019-01-01 DIAGNOSIS — Z23 NEED FOR VACCINATION: ICD-10-CM

## 2019-01-01 DIAGNOSIS — G43.009 MIGRAINE WITHOUT AURA AND WITHOUT STATUS MIGRAINOSUS, NOT INTRACTABLE: ICD-10-CM

## 2019-01-01 DIAGNOSIS — F31.31 BIPOLAR AFFECTIVE DISORDER, CURRENTLY DEPRESSED, MILD (HCC): ICD-10-CM

## 2019-01-01 DIAGNOSIS — J45.20 MILD INTERMITTENT ASTHMA WITHOUT COMPLICATION: ICD-10-CM

## 2019-01-01 DIAGNOSIS — Z51.81 ENCOUNTER FOR THERAPEUTIC DRUG MONITORING: ICD-10-CM

## 2019-01-01 DIAGNOSIS — E78.00 HYPERCHOLESTEREMIA: Primary | ICD-10-CM

## 2019-01-01 LAB
ALBUMIN SERPL-MCNC: 3.8 G/DL (ref 3.4–5)
ALBUMIN/GLOB SERPL: 1.1 {RATIO} (ref 1–2)
ALP LIVER SERPL-CCNC: 87 U/L (ref 37–98)
ALT SERPL-CCNC: 17 U/L (ref 13–56)
ANION GAP SERPL CALC-SCNC: 6 MMOL/L (ref 0–18)
AST SERPL-CCNC: 14 U/L (ref 15–37)
BASOPHILS # BLD AUTO: 0.03 X10(3) UL (ref 0–0.2)
BASOPHILS NFR BLD AUTO: 0.4 %
BILIRUB SERPL-MCNC: 0.3 MG/DL (ref 0.1–2)
BUN BLD-MCNC: 11 MG/DL (ref 7–18)
BUN/CREAT SERPL: 15.7 (ref 10–20)
CALCIUM BLD-MCNC: 9.7 MG/DL (ref 8.5–10.1)
CHLORIDE SERPL-SCNC: 110 MMOL/L (ref 98–112)
CHOLEST SMN-MCNC: 232 MG/DL (ref ?–200)
CK SERPL-CCNC: 45 U/L (ref 26–192)
CO2 SERPL-SCNC: 23 MMOL/L (ref 21–32)
CREAT BLD-MCNC: 0.7 MG/DL (ref 0.55–1.02)
DEPRECATED RDW RBC AUTO: 41.3 FL (ref 35.1–46.3)
EOSINOPHIL # BLD AUTO: 0.11 X10(3) UL (ref 0–0.7)
EOSINOPHIL NFR BLD AUTO: 1.6 %
ERYTHROCYTE [DISTWIDTH] IN BLOOD BY AUTOMATED COUNT: 12.2 % (ref 11–15)
GLOBULIN PLAS-MCNC: 3.5 G/DL (ref 2.8–4.4)
GLUCOSE BLD-MCNC: 96 MG/DL (ref 70–99)
HCT VFR BLD AUTO: 47.6 % (ref 35–48)
HDLC SERPL-MCNC: 45 MG/DL (ref 40–59)
HGB BLD-MCNC: 15.8 G/DL (ref 12–16)
IMM GRANULOCYTES # BLD AUTO: 0.03 X10(3) UL (ref 0–1)
IMM GRANULOCYTES NFR BLD: 0.4 %
LDLC SERPL CALC-MCNC: 137 MG/DL (ref ?–100)
LYMPHOCYTES # BLD AUTO: 2.33 X10(3) UL (ref 1–4)
LYMPHOCYTES NFR BLD AUTO: 33.3 %
M PROTEIN MFR SERPL ELPH: 7.3 G/DL (ref 6.4–8.2)
MCH RBC QN AUTO: 30.6 PG (ref 26–34)
MCHC RBC AUTO-ENTMCNC: 33.2 G/DL (ref 31–37)
MCV RBC AUTO: 92.2 FL (ref 80–100)
MONOCYTES # BLD AUTO: 0.55 X10(3) UL (ref 0.1–1)
MONOCYTES NFR BLD AUTO: 7.9 %
NEUTROPHILS # BLD AUTO: 3.95 X10 (3) UL (ref 1.5–7.7)
NEUTROPHILS # BLD AUTO: 3.95 X10(3) UL (ref 1.5–7.7)
NEUTROPHILS NFR BLD AUTO: 56.4 %
NONHDLC SERPL-MCNC: 187 MG/DL (ref ?–130)
OSMOLALITY SERPL CALC.SUM OF ELEC: 287 MOSM/KG (ref 275–295)
PLATELET # BLD AUTO: 326 10(3)UL (ref 150–450)
POTASSIUM SERPL-SCNC: 4 MMOL/L (ref 3.5–5.1)
RBC # BLD AUTO: 5.16 X10(6)UL (ref 3.8–5.3)
SODIUM SERPL-SCNC: 139 MMOL/L (ref 136–145)
TRIGL SERPL-MCNC: 249 MG/DL (ref 30–149)
TSI SER-ACNC: 0.37 MIU/ML (ref 0.36–3.74)
VLDLC SERPL CALC-MCNC: 50 MG/DL (ref 0–30)
WBC # BLD AUTO: 7 X10(3) UL (ref 4–11)

## 2019-01-01 PROCEDURE — 90732 PPSV23 VACC 2 YRS+ SUBQ/IM: CPT | Performed by: FAMILY MEDICINE

## 2019-01-01 PROCEDURE — 82550 ASSAY OF CK (CPK): CPT | Performed by: FAMILY MEDICINE

## 2019-01-01 PROCEDURE — 36415 COLL VENOUS BLD VENIPUNCTURE: CPT | Performed by: FAMILY MEDICINE

## 2019-01-01 PROCEDURE — 80050 GENERAL HEALTH PANEL: CPT | Performed by: FAMILY MEDICINE

## 2019-01-01 PROCEDURE — 80061 LIPID PANEL: CPT | Performed by: FAMILY MEDICINE

## 2019-01-01 PROCEDURE — 90471 IMMUNIZATION ADMIN: CPT | Performed by: FAMILY MEDICINE

## 2019-01-01 PROCEDURE — 99214 OFFICE O/P EST MOD 30 MIN: CPT | Performed by: FAMILY MEDICINE

## 2019-01-01 RX ORDER — HYDROCODONE BITARTRATE AND ACETAMINOPHEN 10; 325 MG/1; MG/1
TABLET ORAL
Qty: 60 TABLET | Refills: 0 | Status: SHIPPED | OUTPATIENT
Start: 2019-01-01 | End: 2019-01-01

## 2019-01-01 RX ORDER — ALPRAZOLAM 1 MG/1
TABLET ORAL
Qty: 60 TABLET | Refills: 0 | Status: SHIPPED | OUTPATIENT
Start: 2019-01-01 | End: 2019-01-01

## 2019-01-01 RX ORDER — ROSUVASTATIN CALCIUM 5 MG/1
5 TABLET, COATED ORAL NIGHTLY
Qty: 90 TABLET | Refills: 0 | Status: CANCELLED | OUTPATIENT
Start: 2019-01-01

## 2019-01-01 RX ORDER — MORPHINE SULFATE 90 MG/1
90 CAPSULE, EXTENDED RELEASE ORAL 2 TIMES DAILY
Qty: 60 CAPSULE | Refills: 0 | Status: SHIPPED | OUTPATIENT
Start: 2019-01-01 | End: 2019-01-01

## 2019-01-01 RX ORDER — FLUVASTATIN 20 MG/1
20 CAPSULE ORAL NIGHTLY
Qty: 90 CAPSULE | Refills: 0 | Status: SHIPPED | OUTPATIENT
Start: 2019-01-01 | End: 2019-01-01 | Stop reason: RX

## 2019-01-01 RX ORDER — SUMATRIPTAN 100 MG/1
100 TABLET, FILM COATED ORAL EVERY 2 HOUR PRN
Qty: 9 TABLET | Refills: 1 | Status: SHIPPED | OUTPATIENT
Start: 2019-01-01 | End: 2020-01-01

## 2019-01-01 RX ORDER — MORPHINE SULFATE 90 MG/1
90 CAPSULE, EXTENDED RELEASE ORAL DAILY
Qty: 30 CAPSULE | Refills: 0 | Status: SHIPPED | OUTPATIENT
Start: 2019-01-01 | End: 2019-01-01

## 2019-01-01 RX ORDER — HYDROCODONE BITARTRATE AND ACETAMINOPHEN 10; 325 MG/1; MG/1
TABLET ORAL
Qty: 60 TABLET | Refills: 0 | Status: SHIPPED | OUTPATIENT
Start: 2019-01-01 | End: 2020-01-01

## 2019-01-01 RX ORDER — FLUVASTATIN 20 MG/1
20 CAPSULE ORAL NIGHTLY
Qty: 90 CAPSULE | Refills: 0 | OUTPATIENT
Start: 2019-01-01 | End: 2020-01-01

## 2019-01-01 RX ORDER — ALPRAZOLAM 1 MG/1
TABLET ORAL
Qty: 60 TABLET | Refills: 0 | Status: SHIPPED | OUTPATIENT
Start: 2019-01-01 | End: 2020-01-01

## 2019-01-01 RX ORDER — FLUVASTATIN SODIUM 80 MG/1
TABLET, FILM COATED, EXTENDED RELEASE ORAL
Qty: 30 TABLET | Refills: 0 | Status: SHIPPED | OUTPATIENT
Start: 2019-01-01 | End: 2019-01-01 | Stop reason: DRUGHIGH

## 2019-01-01 RX ORDER — ALBUTEROL SULFATE 90 UG/1
AEROSOL, METERED RESPIRATORY (INHALATION)
Qty: 8.5 INHALER | Refills: 0 | Status: SHIPPED | OUTPATIENT
Start: 2019-01-01 | End: 2019-01-01

## 2019-01-01 RX ORDER — LEVOTHYROXINE SODIUM 0.15 MG/1
150 TABLET ORAL
Qty: 90 TABLET | Refills: 3 | Status: SHIPPED | OUTPATIENT
Start: 2019-01-01

## 2019-01-01 RX ORDER — LURASIDONE HYDROCHLORIDE 20 MG/1
TABLET, FILM COATED ORAL
Qty: 90 TABLET | Refills: 0 | Status: SHIPPED | OUTPATIENT
Start: 2019-01-01

## 2019-01-01 RX ORDER — FAMCICLOVIR 500 MG/1
500 TABLET, FILM COATED ORAL DAILY
Qty: 90 TABLET | Refills: 0 | Status: SHIPPED | OUTPATIENT
Start: 2019-01-01 | End: 2020-01-01

## 2019-01-01 RX ORDER — FLUVASTATIN SODIUM 80 MG/1
TABLET, FILM COATED, EXTENDED RELEASE ORAL
Qty: 30 TABLET | Refills: 0 | Status: SHIPPED | OUTPATIENT
Start: 2019-01-01 | End: 2019-01-01

## 2019-01-01 RX ORDER — LURASIDONE HYDROCHLORIDE 20 MG/1
TABLET, FILM COATED ORAL
Qty: 90 TABLET | Refills: 0 | OUTPATIENT
Start: 2019-01-01

## 2019-01-01 RX ORDER — ALBUTEROL SULFATE 90 UG/1
2 AEROSOL, METERED RESPIRATORY (INHALATION) EVERY 6 HOURS PRN
Qty: 6 INHALER | Refills: 6 | Status: SHIPPED | OUTPATIENT
Start: 2019-01-01 | End: 2019-01-01

## 2019-01-01 RX ORDER — MORPHINE SULFATE 90 MG/1
90 CAPSULE, EXTENDED RELEASE ORAL DAILY
Qty: 30 CAPSULE | Refills: 0 | Status: SHIPPED | OUTPATIENT
Start: 2019-01-01 | End: 2020-01-01

## 2019-01-01 RX ORDER — LURASIDONE HYDROCHLORIDE 20 MG/1
TABLET, FILM COATED ORAL
Qty: 90 TABLET | Refills: 0 | Status: SHIPPED | OUTPATIENT
Start: 2019-01-01 | End: 2019-01-01

## 2019-01-02 ENCOUNTER — TELEPHONE (OUTPATIENT)
Dept: FAMILY MEDICINE CLINIC | Facility: CLINIC | Age: 44
End: 2019-01-02

## 2019-01-02 DIAGNOSIS — G43.009 MIGRAINE WITHOUT AURA AND WITHOUT STATUS MIGRAINOSUS, NOT INTRACTABLE: Primary | ICD-10-CM

## 2019-01-02 RX ORDER — SUMATRIPTAN 100 MG/1
100 TABLET, FILM COATED ORAL EVERY 2 HOUR PRN
Qty: 9 TABLET | Refills: 1 | Status: SHIPPED | OUTPATIENT
Start: 2019-01-02 | End: 2019-01-01

## 2019-01-03 DIAGNOSIS — F32.5 MAJOR DEPRESSIVE DISORDER, SINGLE EPISODE, IN REMISSION (HCC): ICD-10-CM

## 2019-01-03 DIAGNOSIS — Z51.81 ENCOUNTER FOR THERAPEUTIC DRUG MONITORING: ICD-10-CM

## 2019-01-03 DIAGNOSIS — F31.31 BIPOLAR AFFECTIVE DISORDER, CURRENTLY DEPRESSED, MILD (HCC): ICD-10-CM

## 2019-01-03 RX ORDER — LURASIDONE HYDROCHLORIDE 80 MG/1
TABLET, FILM COATED ORAL
Qty: 90 TABLET | Refills: 0 | Status: SHIPPED | OUTPATIENT
Start: 2019-01-03 | End: 2019-01-01 | Stop reason: DRUGHIGH

## 2019-01-07 ENCOUNTER — TELEPHONE (OUTPATIENT)
Dept: FAMILY MEDICINE CLINIC | Facility: CLINIC | Age: 44
End: 2019-01-07

## 2019-01-07 DIAGNOSIS — J44.9 CHRONIC OBSTRUCTIVE PULMONARY DISEASE, UNSPECIFIED COPD TYPE (HCC): ICD-10-CM

## 2019-01-08 DIAGNOSIS — J44.9 CHRONIC OBSTRUCTIVE PULMONARY DISEASE, UNSPECIFIED COPD TYPE (HCC): ICD-10-CM

## 2019-01-23 ENCOUNTER — OFFICE VISIT (OUTPATIENT)
Dept: FAMILY MEDICINE CLINIC | Facility: CLINIC | Age: 44
End: 2019-01-23
Payer: COMMERCIAL

## 2019-01-23 VITALS
DIASTOLIC BLOOD PRESSURE: 60 MMHG | HEIGHT: 64 IN | BODY MASS INDEX: 28.41 KG/M2 | OXYGEN SATURATION: 97 % | SYSTOLIC BLOOD PRESSURE: 100 MMHG | WEIGHT: 166.38 LBS | HEART RATE: 73 BPM | TEMPERATURE: 98 F

## 2019-01-23 DIAGNOSIS — E78.00 HYPERCHOLESTEREMIA: ICD-10-CM

## 2019-01-23 DIAGNOSIS — N93.8 DUB (DYSFUNCTIONAL UTERINE BLEEDING): ICD-10-CM

## 2019-01-23 DIAGNOSIS — F31.31 BIPOLAR AFFECTIVE DISORDER, CURRENTLY DEPRESSED, MILD (HCC): ICD-10-CM

## 2019-01-23 DIAGNOSIS — R20.2 PARESTHESIA: ICD-10-CM

## 2019-01-23 DIAGNOSIS — M79.7 FIBROMYALGIA SYNDROME: ICD-10-CM

## 2019-01-23 DIAGNOSIS — G89.29 CHRONIC BACK PAIN, UNSPECIFIED BACK LOCATION, UNSPECIFIED BACK PAIN LATERALITY: ICD-10-CM

## 2019-01-23 DIAGNOSIS — J44.9 CHRONIC OBSTRUCTIVE PULMONARY DISEASE, UNSPECIFIED COPD TYPE (HCC): ICD-10-CM

## 2019-01-23 DIAGNOSIS — Z12.31 BREAST CANCER SCREENING BY MAMMOGRAM: ICD-10-CM

## 2019-01-23 DIAGNOSIS — M54.9 CHRONIC BACK PAIN, UNSPECIFIED BACK LOCATION, UNSPECIFIED BACK PAIN LATERALITY: ICD-10-CM

## 2019-01-23 DIAGNOSIS — G43.009 MIGRAINE WITHOUT AURA AND WITHOUT STATUS MIGRAINOSUS, NOT INTRACTABLE: ICD-10-CM

## 2019-01-23 DIAGNOSIS — E55.9 VITAMIN D DEFICIENCY: ICD-10-CM

## 2019-01-23 DIAGNOSIS — Z51.81 ENCOUNTER FOR THERAPEUTIC DRUG MONITORING: Primary | ICD-10-CM

## 2019-01-23 DIAGNOSIS — E03.9 ACQUIRED HYPOTHYROIDISM: ICD-10-CM

## 2019-01-23 LAB
ALBUMIN SERPL-MCNC: 4.1 G/DL (ref 3.1–4.5)
ALBUMIN/GLOB SERPL: 1.2 {RATIO} (ref 1–2)
ALP LIVER SERPL-CCNC: 104 U/L (ref 37–98)
ALT SERPL-CCNC: 34 U/L (ref 14–54)
ANION GAP SERPL CALC-SCNC: 4 MMOL/L (ref 0–18)
AST SERPL-CCNC: 30 U/L (ref 15–41)
BASOPHILS # BLD AUTO: 0.04 X10(3) UL (ref 0–0.1)
BASOPHILS NFR BLD AUTO: 0.4 %
BILIRUB SERPL-MCNC: 0.5 MG/DL (ref 0.1–2)
BUN BLD-MCNC: 10 MG/DL (ref 8–20)
BUN/CREAT SERPL: 13.2 (ref 10–20)
CALCIUM BLD-MCNC: 9.5 MG/DL (ref 8.3–10.3)
CHLORIDE SERPL-SCNC: 109 MMOL/L (ref 101–111)
CHOLEST SMN-MCNC: 255 MG/DL (ref ?–200)
CO2 SERPL-SCNC: 27 MMOL/L (ref 22–32)
CREAT BLD-MCNC: 0.76 MG/DL (ref 0.55–1.02)
DEPRECATED HBV CORE AB SER IA-ACNC: 68.1 NG/ML (ref 12–240)
EOSINOPHIL # BLD AUTO: 0.11 X10(3) UL (ref 0–0.3)
EOSINOPHIL NFR BLD AUTO: 1.1 %
ERYTHROCYTE [DISTWIDTH] IN BLOOD BY AUTOMATED COUNT: 12.3 % (ref 11.5–16)
FSH SERPL-ACNC: 60.6 MIU/ML
GLOBULIN PLAS-MCNC: 3.4 G/DL (ref 2.8–4.4)
GLUCOSE BLD-MCNC: 101 MG/DL (ref 70–99)
HAV AB SERPL IA-ACNC: 556 PG/ML (ref 193–986)
HCT VFR BLD AUTO: 51.3 % (ref 34–50)
HDLC SERPL-MCNC: 51 MG/DL (ref 40–59)
HGB BLD-MCNC: 16.5 G/DL (ref 12–16)
IMMATURE GRANULOCYTE COUNT: 0.02 X10(3) UL (ref 0–1)
IMMATURE GRANULOCYTE RATIO %: 0.2 %
LDLC SERPL CALC-MCNC: 176 MG/DL (ref ?–100)
LYMPHOCYTES # BLD AUTO: 3.24 X10(3) UL (ref 0.9–4)
LYMPHOCYTES NFR BLD AUTO: 33.9 %
M PROTEIN MFR SERPL ELPH: 7.5 G/DL (ref 6.4–8.2)
MCH RBC QN AUTO: 29.7 PG (ref 27–33.2)
MCHC RBC AUTO-ENTMCNC: 32.2 G/DL (ref 31–37)
MCV RBC AUTO: 92.3 FL (ref 81–100)
MONOCYTES # BLD AUTO: 0.72 X10(3) UL (ref 0.1–1)
MONOCYTES NFR BLD AUTO: 7.5 %
NEUTROPHIL ABS PRELIM: 5.44 X10 (3) UL (ref 1.3–6.7)
NEUTROPHILS # BLD AUTO: 5.44 X10(3) UL (ref 1.3–6.7)
NEUTROPHILS NFR BLD AUTO: 56.9 %
NONHDLC SERPL-MCNC: 204 MG/DL (ref ?–130)
OSMOLALITY SERPL CALC.SUM OF ELEC: 289 MOSM/KG (ref 275–295)
PLATELET # BLD AUTO: 346 10(3)UL (ref 150–450)
POTASSIUM SERPL-SCNC: 4.6 MMOL/L (ref 3.6–5.1)
RBC # BLD AUTO: 5.56 X10(6)UL (ref 3.8–5.1)
RED CELL DISTRIBUTION WIDTH-SD: 42.4 FL (ref 35.1–46.3)
SODIUM SERPL-SCNC: 140 MMOL/L (ref 136–144)
T4 FREE SERPL-MCNC: 1.5 NG/DL (ref 0.9–1.8)
TRIGL SERPL-MCNC: 141 MG/DL (ref 30–149)
TSI SER-ACNC: 1.48 MIU/ML (ref 0.35–5.5)
VIT D+METAB SERPL-MCNC: 16.7 NG/ML (ref 30–100)
VLDLC SERPL CALC-MCNC: 28 MG/DL (ref 0–30)
WBC # BLD AUTO: 9.6 X10(3) UL (ref 4–13)

## 2019-01-23 PROCEDURE — 80050 GENERAL HEALTH PANEL: CPT | Performed by: FAMILY MEDICINE

## 2019-01-23 PROCEDURE — 84439 ASSAY OF FREE THYROXINE: CPT | Performed by: FAMILY MEDICINE

## 2019-01-23 PROCEDURE — 36415 COLL VENOUS BLD VENIPUNCTURE: CPT | Performed by: FAMILY MEDICINE

## 2019-01-23 PROCEDURE — 80061 LIPID PANEL: CPT | Performed by: FAMILY MEDICINE

## 2019-01-23 PROCEDURE — 99214 OFFICE O/P EST MOD 30 MIN: CPT | Performed by: FAMILY MEDICINE

## 2019-01-23 PROCEDURE — 82728 ASSAY OF FERRITIN: CPT | Performed by: FAMILY MEDICINE

## 2019-01-23 PROCEDURE — 83001 ASSAY OF GONADOTROPIN (FSH): CPT | Performed by: FAMILY MEDICINE

## 2019-01-23 PROCEDURE — 82607 VITAMIN B-12: CPT | Performed by: FAMILY MEDICINE

## 2019-01-23 PROCEDURE — 82306 VITAMIN D 25 HYDROXY: CPT | Performed by: FAMILY MEDICINE

## 2019-01-23 RX ORDER — MORPHINE SULFATE 90 MG/1
90 CAPSULE, EXTENDED RELEASE ORAL 2 TIMES DAILY
Qty: 60 CAPSULE | Refills: 0 | Status: SHIPPED | OUTPATIENT
Start: 2019-01-23 | End: 2019-03-05

## 2019-01-23 RX ORDER — HYDROCODONE BITARTRATE AND ACETAMINOPHEN 10; 325 MG/1; MG/1
TABLET ORAL
Qty: 60 TABLET | Refills: 0 | Status: SHIPPED | OUTPATIENT
Start: 2019-01-23 | End: 2019-03-05

## 2019-01-23 NOTE — PATIENT INSTRUCTIONS
Understanding Bipolar Disorder  Bipolar disorder is a serious disorder of the brain. It may severely disrupt your life. At times, it may cause you and your loved ones great pain. But there is hope.  Although there is no cure, treatment can help control yo In depressive episodes, you feel intense sadness and depression. You may also feel worthless, tired, and helpless. Even the things you value most don’t give you pleasure. At times you may want to die. You may even think about taking your own life.   Date [place] · yellowing of the eyes or skin  Side effects that usually do not require medical attention (report to your doctor or health care professional if they continue or are bothersome):  · constipation  · dry mouth  · nausea, vomiting  · tiredness  What may inte Keep out of the reach of children. This medicine can be abused. Keep your medicine in a safe place to protect it from theft. Do not share this medicine with anyone. Selling or giving away this medicine is dangerous and against the law.   This medicine may c Do not suddenly stop taking your medicine because you may develop a severe reaction. Your body becomes used to the medicine. This does NOT mean you are addicted. Addiction is a behavior related to getting and using a drug for a non-medical reason.  If you h Your mouth may get dry. Chewing sugarless gum or sucking hard candy, and drinking plenty of water may help. Contact your doctor if the problem does not go away or is severe. NOTE:This sheet is a summary. It may not cover all possible information.  If you h

## 2019-01-23 NOTE — PROGRESS NOTES
Yovanny Garcia is a 39year old female. HPI:   Tash Gonzales is here to go over her meds and get labs. Frustrated with temperature control. Skin hurts. Feels super hot unpleasant to be touched. Either really hot or cold.  Periods every 2 weeks for 4 months at onset; repeat once after 2 HRS-ONLY 2 IN 24 HR MAX Disp: 9 tablet Rfl: 1   simvastatin 10 MG Oral Tab TAKE 1 TABLET BY MOUTH EVERY DAY AT NIGHT Disp: 90 tablet Rfl: 0   baclofen 20 MG Oral Tab Take 1 tablet (20 mg total) by mouth 3 (three) times daily. auscultation  CARDIO: RRR without murmur  GI: good BS's,no masses, HSM or tenderness  EXTREMITIES: no cyanosis, clubbing or edema    ASSESSMENT AND PLAN:   1.  Encounter for therapeutic drug monitoring  - reviewed meds    [employer] asks to change to alternate

## 2019-02-05 ENCOUNTER — TELEPHONE (OUTPATIENT)
Dept: FAMILY MEDICINE CLINIC | Facility: CLINIC | Age: 44
End: 2019-02-05

## 2019-02-05 NOTE — TELEPHONE ENCOUNTER
Fax received stating medication has been approved for 6 months. Auth # [id number]. Auth faxed to [facility] in [place].      I called the pt and she is aware the medication has been approved for 6 mo. jm

## 2019-02-05 NOTE — TELEPHONE ENCOUNTER
Prior authorization completed online at [facility]. Pending authorization at this time.

## 2019-02-08 RX ORDER — ALPRAZOLAM 1 MG/1
TABLET ORAL
Qty: 60 TABLET | Refills: 0 | Status: SHIPPED
Start: 2019-02-08 | End: 2019-03-06

## 2019-03-05 ENCOUNTER — TELEPHONE (OUTPATIENT)
Dept: FAMILY MEDICINE CLINIC | Facility: CLINIC | Age: 44
End: 2019-03-05

## 2019-03-05 DIAGNOSIS — G89.29 CHRONIC BACK PAIN, UNSPECIFIED BACK LOCATION, UNSPECIFIED BACK PAIN LATERALITY: ICD-10-CM

## 2019-03-05 DIAGNOSIS — M54.9 CHRONIC BACK PAIN, UNSPECIFIED BACK LOCATION, UNSPECIFIED BACK PAIN LATERALITY: ICD-10-CM

## 2019-03-05 RX ORDER — MORPHINE SULFATE 90 MG/1
90 CAPSULE, EXTENDED RELEASE ORAL 2 TIMES DAILY
Qty: 60 CAPSULE | Refills: 0 | Status: SHIPPED | OUTPATIENT
Start: 2019-03-05 | End: 2019-01-01

## 2019-03-05 RX ORDER — HYDROCODONE BITARTRATE AND ACETAMINOPHEN 10; 325 MG/1; MG/1
TABLET ORAL
Qty: 60 TABLET | Refills: 0 | Status: SHIPPED | OUTPATIENT
Start: 2019-03-05 | End: 2019-01-01

## 2019-03-06 ENCOUNTER — TELEPHONE (OUTPATIENT)
Dept: FAMILY MEDICINE CLINIC | Facility: CLINIC | Age: 44
End: 2019-03-06

## 2019-03-06 RX ORDER — ALPRAZOLAM 1 MG/1
TABLET ORAL
Qty: 60 TABLET | Refills: 0 | Status: SHIPPED | OUTPATIENT
Start: 2019-03-06 | End: 2019-03-12

## 2019-03-08 ENCOUNTER — TELEPHONE (OUTPATIENT)
Dept: FAMILY MEDICINE CLINIC | Facility: CLINIC | Age: 44
End: 2019-03-08

## 2019-03-08 RX ORDER — SIMVASTATIN 10 MG
TABLET ORAL
Qty: 90 TABLET | Refills: 1 | Status: SHIPPED | OUTPATIENT
Start: 2019-03-08 | End: 2019-01-01

## 2019-03-08 NOTE — TELEPHONE ENCOUNTER
Last rf 12/10/18 #90x0  Last ov 1/23/19  Last labs 1/23/19 re ck in 6 months    No future appointments.

## 2019-03-12 ENCOUNTER — TELEPHONE (OUTPATIENT)
Dept: FAMILY MEDICINE CLINIC | Facility: CLINIC | Age: 44
End: 2019-03-12

## 2019-03-12 RX ORDER — ALPRAZOLAM 1 MG/1
TABLET ORAL
Qty: 60 TABLET | Refills: 0 | Status: SHIPPED
Start: 2019-03-12 | End: 2019-01-01

## 2019-03-12 NOTE — TELEPHONE ENCOUNTER
Last rf 3/6/19 [facility] [place]. I called the pt to inform her the script was sent on 3/6/19. YANIRA stating info and advised the [facility].  Advised in the message to call back if [facility] did not [facility]. jm

## 2019-03-12 NOTE — TELEPHONE ENCOUNTER
Last rf 3/6/19 #60x0. Fax from [facility] states \"Pt was told new script for Alprazolam was sent on 3/6/19. However, we were having problems with the e-script system on this day and did not receive. Please send again. Thank you! \"

## 2019-03-30 NOTE — TELEPHONE ENCOUNTER
LOV- 1/23/2019  Last refill- 10/3/2018 #90 with no refills. Medication pending in encounter  No future appointments.

## 2019-04-11 NOTE — TELEPHONE ENCOUNTER
Last office visit: 1/23/2019  Last refill: 3/12/2019  Requested Prescriptions     Pending Prescriptions Disp Refills   • ALPRAZolam 1 MG Oral Tab 60 tablet 0     Sig: TAKE 1 TABLET BY MOUTH 2 TIMES DAILY       No future appointments.

## 2019-04-30 NOTE — TELEPHONE ENCOUNTER
Fax request received from [address] [place] [address] requesting refill ProAir HFA.   Last office visit 1-23-19  Last refill 4-5-19 8.5g

## 2019-05-08 NOTE — TELEPHONE ENCOUNTER
Avinza 4/3/19 #60x0  Norco 4/3/19 #60x0  Xanax 4/12/19 #60x0  Last ov 1/23/19  Last lab 1/23/19 f/u in 6 mo    No future appointments.

## 2019-05-08 NOTE — TELEPHONE ENCOUNTER
Avinza    norco    xanex     Patient wants all script written    Agatha Amor or Mikael Fagan will     Please call when ready

## 2019-05-31 NOTE — TELEPHONE ENCOUNTER
Received fax from [facility] requesting patients Proair inhaler. Per written order on paper okay to refill 1 inhaler with 6 additional refills.

## 2019-06-07 NOTE — TELEPHONE ENCOUNTER
Morphine Sulfate ER Beads (AVINZA) 90 MG Oral Capsule SR 24 Hr     HYDROcodone-acetaminophen (NORCO)  MG Oral Tab     PT WILL  NEXT WEEK.

## 2019-07-08 NOTE — PROGRESS NOTES
Queen Evelyne is a 39year old female. HPI:   Jaquelin Juan is here for medication review and labs. Daughter due with baby number 2. Lost grandmother recently. 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   Doing we Spondylolysis, lumbar region     L5 grade 1 anterior    • Unspecified essential hypertension    • Unspecified hypothyroidism    • Vitamin D deficiency 3/28/2017      Social History:  Social History    Tobacco Use      Smoking status: Current Every Day Smok spiriva 1 puff daily  - tobacco cessation    5. Bipolar affective disorder, currently depressed, mild (HCC)  - latuda 20 mg  - if deteriorates to psych  - xanax 1 mg bid    6.  Hypercholesteremia  - simvastatin  Stopped by Rom Day  - low chol diet  - COMP

## 2019-08-02 NOTE — TELEPHONE ENCOUNTER
[facility] 20 mg is on backorder. The only strength available is Fluvastatin 80 mg. Please advise.  jm

## 2019-08-12 NOTE — TELEPHONE ENCOUNTER
Morphine last refilled 7/8/19 #30/0 refills  [place] last refilled 7/8/19 #60/0 refills  Xanax last refilled 7/8/19 #60/0 refills  Last OV 7/8/19

## 2019-08-13 NOTE — TELEPHONE ENCOUNTER
Xanax script faxed. Norco and Morphine scripts are up front for . LM stating info. Advised to call back with questions.  jm

## 2019-10-16 NOTE — TELEPHONE ENCOUNTER
HYDROcodone-acetaminophen (NORCO)  MG Oral Tab and Morphine Sulfate ER Beads 90 MG Oral Capsule SR 24 Hr [facility]

## 2019-10-16 NOTE — TELEPHONE ENCOUNTER
Last refill on both meds for 30 days on 9/13/19  Last office visit on 7/8/19  No future appointments.

## 2019-11-14 NOTE — TELEPHONE ENCOUNTER
Last office visit: 07/08/2019    Last refill for Norco: 10/16/2019 (Pt states she is needing to take Norco BID daily)    Last refill for Morphine Sulfate: 10/16/2019    No future appointments.

## 2019-11-14 NOTE — TELEPHONE ENCOUNTER
REFILL HYDROcodone-acetaminophen (NORCO) & MORPHINE SULFATE TO [facility] IN [place]

## 2019-12-13 NOTE — TELEPHONE ENCOUNTER
Morphine Sulfate ER Beads 90 MG Oral Capsule SR 24 Hr    HYDROcodone-acetaminophen (NORCO)  MG Oral Tab    PROAIR  (90 Base) MCG/ACT Inhalation Aero Soln    [facility] IN [place]

## 2019-12-13 NOTE — TELEPHONE ENCOUNTER
Last office visit: 07/08/2019  Last refill for Morphine: 11/14/2019  Last refill for Norco: 11/14/2019  Last refill for Proair: 6/11/2019  Requested Prescriptions     Pending Prescriptions Disp Refills   • Morphine Sulfate ER Beads 90 MG Oral Capsule SR 24

## 2020-01-01 ENCOUNTER — TELEPHONE (OUTPATIENT)
Dept: FAMILY MEDICINE CLINIC | Facility: CLINIC | Age: 45
End: 2020-01-01

## 2020-01-01 ENCOUNTER — OFFICE VISIT (OUTPATIENT)
Dept: FAMILY MEDICINE CLINIC | Facility: CLINIC | Age: 45
End: 2020-01-01
Payer: COMMERCIAL

## 2020-01-01 ENCOUNTER — LAB ENCOUNTER (OUTPATIENT)
Dept: LAB | Age: 45
End: 2020-01-01
Attending: FAMILY MEDICINE
Payer: COMMERCIAL

## 2020-01-01 VITALS
HEART RATE: 96 BPM | WEIGHT: 194.38 LBS | DIASTOLIC BLOOD PRESSURE: 88 MMHG | TEMPERATURE: 98 F | RESPIRATION RATE: 16 BRPM | BODY MASS INDEX: 33 KG/M2 | SYSTOLIC BLOOD PRESSURE: 140 MMHG

## 2020-01-01 DIAGNOSIS — Z51.81 ENCOUNTER FOR THERAPEUTIC DRUG MONITORING: Primary | ICD-10-CM

## 2020-01-01 DIAGNOSIS — Z51.81 ENCOUNTER FOR THERAPEUTIC DRUG MONITORING: ICD-10-CM

## 2020-01-01 DIAGNOSIS — G89.29 CHRONIC BACK PAIN, UNSPECIFIED BACK LOCATION, UNSPECIFIED BACK PAIN LATERALITY: ICD-10-CM

## 2020-01-01 DIAGNOSIS — E78.00 HYPERCHOLESTEREMIA: ICD-10-CM

## 2020-01-01 DIAGNOSIS — M54.9 CHRONIC BACK PAIN, UNSPECIFIED BACK LOCATION, UNSPECIFIED BACK PAIN LATERALITY: ICD-10-CM

## 2020-01-01 DIAGNOSIS — E03.9 ACQUIRED HYPOTHYROIDISM: ICD-10-CM

## 2020-01-01 DIAGNOSIS — F41.9 ANXIETY: ICD-10-CM

## 2020-01-01 DIAGNOSIS — J44.9 CHRONIC OBSTRUCTIVE PULMONARY DISEASE, UNSPECIFIED COPD TYPE (HCC): ICD-10-CM

## 2020-01-01 DIAGNOSIS — G43.009 MIGRAINE WITHOUT AURA AND WITHOUT STATUS MIGRAINOSUS, NOT INTRACTABLE: ICD-10-CM

## 2020-01-01 DIAGNOSIS — F31.31 BIPOLAR AFFECTIVE DISORDER, CURRENTLY DEPRESSED, MILD (HCC): ICD-10-CM

## 2020-01-01 DIAGNOSIS — E78.00 HYPERCHOLESTEREMIA: Primary | ICD-10-CM

## 2020-01-01 DIAGNOSIS — Z12.31 VISIT FOR SCREENING MAMMOGRAM: ICD-10-CM

## 2020-01-01 LAB
ALBUMIN SERPL-MCNC: 3.9 G/DL (ref 3.4–5)
ALBUMIN/GLOB SERPL: 1 {RATIO} (ref 1–2)
ALP LIVER SERPL-CCNC: 110 U/L (ref 37–98)
ALT SERPL-CCNC: 18 U/L (ref 13–56)
ANION GAP SERPL CALC-SCNC: 5 MMOL/L (ref 0–18)
AST SERPL-CCNC: 15 U/L (ref 15–37)
BASOPHILS # BLD AUTO: 0.04 X10(3) UL (ref 0–0.2)
BASOPHILS NFR BLD AUTO: 0.7 %
BILIRUB SERPL-MCNC: 0.4 MG/DL (ref 0.1–2)
BUN BLD-MCNC: 5 MG/DL (ref 7–18)
BUN/CREAT SERPL: 6.7 (ref 10–20)
CALCIUM BLD-MCNC: 8.9 MG/DL (ref 8.5–10.1)
CHLORIDE SERPL-SCNC: 109 MMOL/L (ref 98–112)
CHOLEST SMN-MCNC: 237 MG/DL (ref ?–200)
CK SERPL-CCNC: 90 U/L (ref 26–192)
CO2 SERPL-SCNC: 24 MMOL/L (ref 21–32)
CREAT BLD-MCNC: 0.75 MG/DL (ref 0.55–1.02)
DEPRECATED RDW RBC AUTO: 46 FL (ref 35.1–46.3)
EOSINOPHIL # BLD AUTO: 0.09 X10(3) UL (ref 0–0.7)
EOSINOPHIL NFR BLD AUTO: 1.5 %
ERYTHROCYTE [DISTWIDTH] IN BLOOD BY AUTOMATED COUNT: 13.6 % (ref 11–15)
GLOBULIN PLAS-MCNC: 3.8 G/DL (ref 2.8–4.4)
GLUCOSE BLD-MCNC: 104 MG/DL (ref 70–99)
HCT VFR BLD AUTO: 47.6 % (ref 35–48)
HDLC SERPL-MCNC: 51 MG/DL (ref 40–59)
HGB BLD-MCNC: 15.4 G/DL (ref 12–16)
IMM GRANULOCYTES # BLD AUTO: 0.01 X10(3) UL (ref 0–1)
IMM GRANULOCYTES NFR BLD: 0.2 %
LDLC SERPL CALC-MCNC: 164 MG/DL (ref ?–100)
LYMPHOCYTES # BLD AUTO: 1.89 X10(3) UL (ref 1–4)
LYMPHOCYTES NFR BLD AUTO: 31.8 %
M PROTEIN MFR SERPL ELPH: 7.7 G/DL (ref 6.4–8.2)
MCH RBC QN AUTO: 29.7 PG (ref 26–34)
MCHC RBC AUTO-ENTMCNC: 32.4 G/DL (ref 31–37)
MCV RBC AUTO: 91.7 FL (ref 80–100)
MONOCYTES # BLD AUTO: 0.51 X10(3) UL (ref 0.1–1)
MONOCYTES NFR BLD AUTO: 8.6 %
NEUTROPHILS # BLD AUTO: 3.41 X10 (3) UL (ref 1.5–7.7)
NEUTROPHILS # BLD AUTO: 3.41 X10(3) UL (ref 1.5–7.7)
NEUTROPHILS NFR BLD AUTO: 57.2 %
NONHDLC SERPL-MCNC: 186 MG/DL (ref ?–130)
OSMOLALITY SERPL CALC.SUM OF ELEC: 284 MOSM/KG (ref 275–295)
PATIENT FASTING Y/N/NP: YES
PATIENT FASTING Y/N/NP: YES
PLATELET # BLD AUTO: 260 10(3)UL (ref 150–450)
POTASSIUM SERPL-SCNC: 4 MMOL/L (ref 3.5–5.1)
RBC # BLD AUTO: 5.19 X10(6)UL (ref 3.8–5.3)
SODIUM SERPL-SCNC: 138 MMOL/L (ref 136–145)
TRIGL SERPL-MCNC: 112 MG/DL (ref 30–149)
TSI SER-ACNC: 0.89 MIU/ML (ref 0.36–3.74)
VLDLC SERPL CALC-MCNC: 22 MG/DL (ref 0–30)
WBC # BLD AUTO: 6 X10(3) UL (ref 4–11)

## 2020-01-01 PROCEDURE — 80061 LIPID PANEL: CPT | Performed by: FAMILY MEDICINE

## 2020-01-01 PROCEDURE — 99214 OFFICE O/P EST MOD 30 MIN: CPT | Performed by: FAMILY MEDICINE

## 2020-01-01 PROCEDURE — 36415 COLL VENOUS BLD VENIPUNCTURE: CPT | Performed by: FAMILY MEDICINE

## 2020-01-01 PROCEDURE — 80050 GENERAL HEALTH PANEL: CPT | Performed by: FAMILY MEDICINE

## 2020-01-01 PROCEDURE — 82550 ASSAY OF CK (CPK): CPT | Performed by: FAMILY MEDICINE

## 2020-01-01 RX ORDER — HYDROCODONE BITARTRATE AND ACETAMINOPHEN 10; 325 MG/1; MG/1
TABLET ORAL
Qty: 60 TABLET | Refills: 0 | Status: SHIPPED | OUTPATIENT
Start: 2020-01-01

## 2020-01-01 RX ORDER — FAMCICLOVIR 500 MG/1
TABLET, FILM COATED ORAL
Qty: 90 TABLET | Refills: 0 | Status: SHIPPED | OUTPATIENT
Start: 2020-01-01

## 2020-01-01 RX ORDER — SUMATRIPTAN 100 MG/1
TABLET, FILM COATED ORAL
Qty: 9 TABLET | Refills: 1 | Status: SHIPPED | OUTPATIENT
Start: 2020-01-01 | End: 2020-01-01

## 2020-01-01 RX ORDER — ALPRAZOLAM 1 MG/1
TABLET ORAL
Qty: 60 TABLET | Refills: 0 | Status: SHIPPED | OUTPATIENT
Start: 2020-01-01 | End: 2020-01-01

## 2020-01-01 RX ORDER — ROSUVASTATIN CALCIUM 5 MG/1
5 TABLET, COATED ORAL NIGHTLY
Qty: 90 TABLET | Refills: 0 | Status: SHIPPED | OUTPATIENT
Start: 2020-01-01

## 2020-01-01 RX ORDER — SUMATRIPTAN 100 MG/1
100 TABLET, FILM COATED ORAL EVERY 2 HOUR PRN
Qty: 9 TABLET | Refills: 1 | Status: SHIPPED | OUTPATIENT
Start: 2020-01-01

## 2020-01-01 RX ORDER — FLUVASTATIN 20 MG/1
20 CAPSULE ORAL NIGHTLY
Qty: 90 CAPSULE | Refills: 1 | Status: SHIPPED | OUTPATIENT
Start: 2020-01-01 | End: 2020-01-01

## 2020-01-01 RX ORDER — MORPHINE SULFATE 90 MG/1
90 CAPSULE, EXTENDED RELEASE ORAL DAILY
Qty: 30 CAPSULE | Refills: 0 | Status: SHIPPED | OUTPATIENT
Start: 2020-01-01 | End: 2020-01-01

## 2020-01-01 RX ORDER — MORPHINE SULFATE 90 MG/1
90 CAPSULE, EXTENDED RELEASE ORAL DAILY
Qty: 30 CAPSULE | Refills: 0 | Status: SHIPPED | OUTPATIENT
Start: 2020-01-01

## 2020-01-01 RX ORDER — HYDROCODONE BITARTRATE AND ACETAMINOPHEN 10; 325 MG/1; MG/1
TABLET ORAL
Qty: 60 TABLET | Refills: 0 | Status: SHIPPED | OUTPATIENT
Start: 2020-01-01 | End: 2020-01-01

## 2020-01-01 RX ORDER — ALPRAZOLAM 1 MG/1
1 TABLET ORAL 2 TIMES DAILY
Qty: 60 TABLET | Refills: 0 | Status: SHIPPED | OUTPATIENT
Start: 2020-01-01

## 2020-01-13 NOTE — TELEPHONE ENCOUNTER
Per notes from last OV in July 2019  Instructions         Return in about 6 months (around 1/8/2020) for RECHECK    Called pt and advised of need for appt    Scheduled for Wednesday afternoon    Future Appointments   Date Time Provider Gonzalo Dawn

## 2020-01-13 NOTE — TELEPHONE ENCOUNTER
REFILL    Morphine Sulfate ER Beads 90 MG Oral Capsule SR 24 Hr    HYDROcodone-acetaminophen (NORCO)  MG Oral Tab    [facility]

## 2020-01-15 NOTE — PROGRESS NOTES
Yovanny Garcia is a 38year old female. HPI:   Corinne Hill is here for medication recheck. Insurance has not approved fluvastatin. Is doing well otherwise with her current meds. Under a lot of stress for health of her daughter. Has been doing well.  Will bipolar    • Chronic rhinitis    • DEPRESSION    • Depression    • Extrinsic asthma, unspecified    • Fibromyalgia    • HEADACHES    • Spondylolysis, lumbar region     L5 grade 1 anterior    • Unspecified essential hypertension    • Unspecified hypothyroid <200 mg/dL  Borderline  200-239 mg/dL  High      >=240 mg/dL       • HDL Cholesterol 07/08/2019 45  40 - 59 mg/dL Final      Interpretive Information:   An HDL cholesterol <40 mg/dL is low and constitutes a coronary heart disease risk factor.  An HDL kristina - 2.0 Final   • WBC 07/08/2019 7.0  4.0 - 11.0 x10(3) uL Final   • RBC 07/08/2019 5.16  3.80 - 5.30 x10(6)uL Final   • HGB 07/08/2019 15.8  12.0 - 16.0 g/dL Final   • HCT 07/08/2019 47.6  35.0 - 48.0 % Final   • PLT 07/08/2019 326.0  150.0 - 450.0 10(3)uL total) by mouth daily. Dispense: 30 capsule; Refill: 0  - HYDROcodone-acetaminophen (NORCO)  MG Oral Tab; 1 taB Q 12 HRS FOR BREAKTHROUGH PAIN  Dispense: 60 tablet; Refill: 0    5. Anxiety  - latuda 20 mg  - alpraxaloam 1 mg bid    6.  Hypercholester

## 2020-01-16 NOTE — TELEPHONE ENCOUNTER
LOV: 1/15/20    LAST LAB: 1/15/20    LAST RX: 4/1/19, 90 tabs, 0 refills    Next OV: No future appointments.       Herpes Agent Protocol Passed1/16 11:11 AM   Appointment in the past 12 or next 3 months

## 2020-01-16 NOTE — TELEPHONE ENCOUNTER
LOV: 1/15/20    LAST LAB: 1/15/20    LAST RX: 11/5/19, 90 caps, 0 refills    Next OV: No future appointments.     PROTOCOL  Cholesterol Medication Protocol Passed1/16 12:14 PM   ALT < 80    ALT resulted within past year    Lipid panel within past 12 months

## 2020-01-16 NOTE — TELEPHONE ENCOUNTER
Per Negra Cleaning from [facility], Crestor does not contain \"red dye #40\" which is the component that causes red dye allergy. The primary component of this drug is ferric oxide.

## 2020-01-16 NOTE — TELEPHONE ENCOUNTER
I would recommend trying Crestor but there is a warning coming up that it is not recommended due to Red Dye allergy.  Can you confirm with pharmacist? Thanks

## 2020-01-16 NOTE — TELEPHONE ENCOUNTER
**Per med list, pt reported on 1/15/20 that she is no longer taking this med. L/M for pt to clarify. ** Pt confirmed she is taking medication PRN. LOV: 1/15/20    LAST LAB: 1/15/20    LAST RX: 4/5/19    Next OV: No future appointments.     PROTOCOL:

## 2020-02-04 NOTE — TELEPHONE ENCOUNTER
LOV  1/15/20    LAST LAB 1/15/20    LAST RX 1/7/20    Next OV No future appointments.       PROTOCOL  none

## 2020-02-12 NOTE — TELEPHONE ENCOUNTER
LOV 1/15/20    LAST LAB 1/15/20    LAST RX  Morphine sulfate 1/15/20 30 caps 0 refills  Norco 1/15/20 60 tabs 0 refills      Next OV No future appointments.       PROTOCOL  None

## 2020-03-03 NOTE — TELEPHONE ENCOUNTER
LOV 1/15/20   The patient is asked to return in 4 months. LAST LAB 1/15/20    LAST RX  2/4/20 60 tabs 0 refills    Next OV No future appointments.       PROTOCOL  None

## 2020-03-14 NOTE — TELEPHONE ENCOUNTER
Last OV with Dr Dharmesh Florian 1/15/20  Last refill 12/18/19  No future appointments  Last labs 1/16/20

## 2020-04-03 ENCOUNTER — TELEPHONE (OUTPATIENT)
Dept: FAMILY MEDICINE CLINIC | Facility: CLINIC | Age: 45
End: 2020-04-03

## 2020-04-03 NOTE — TELEPHONE ENCOUNTER
said that Christine Sewell passed away last Tuesday 3/24/20 in [facility]. She was transferred there after being unresponsive. He said she was on a ventilator and seemed to be improving but then had internal bleeding. He wanted Dr Suzi Godfrey to know.

## 2020-04-03 NOTE — TELEPHONE ENCOUNTER
wants nurse or doctor to call him regarding yuli he did not want to discuss with me what was going on please call pt  back

## 2020-04-04 NOTE — TELEPHONE ENCOUNTER
I called Addi to send condolences to him an his family regarding Tiffany passing. Message left on voice mail. I will call back in the next few days.

## 2020-04-07 NOTE — TELEPHONE ENCOUNTER
Talked to Tam Hyatt about Maxatawny last few days of life. He is coping and was able to spend quality of time with her. All kids are doing well and coping with mothers death. Condolences expressed to Tam Hyatt.

## (undated) NOTE — LETTER
PATIENT AGREEMENT: Opioid Treatment Agreement   PATIENT NAME:  Jb Sanabria      MRN:  [id number]  PHYSICIAN/SITE:   Erickson Chris, DO / [address] [place] [address]    I understand that this agreement between myself, _______________ ? I will submit urine and/or blood upon request by my physician or medical provider for testing at any time without prior notification to detect the use of non-prescribed drugs and medications, and confirm the use of prescribed ones.  I will submit to pill ? I will inform my physician of all medications that I am currently taking; including prescription, non-prescription, herbal remedies and supplements.   ? I understand that I may be referred to a specialist as determined by my primary care physician to atif ______________________________    ____________    ________________________            Witness Signature                                Date                  Witness Printed Name

## (undated) NOTE — Clinical Note
ASTHMA ACTION PLAN for Poly Screen     : 1975          Date: 3/28/2017    Claudy Chavez DO  [facility], [facility], [address] [place] [id number] [phone] 1.   GREEN - GO!  % [employer]

## (undated) NOTE — MR AVS SNAPSHOT
Johan Antonio  [address] [facility] [address]  [phone]               Thank you for choosing us for your health care visit with Robbie Ceballos 21., DO.   We are glad to serve you and happy to provide you with this summary Encounter for long-term (current) use of high-risk medication        Osteoarthritis of lumbar spine, unspecified spinal osteoarthritis complication status        Vitamin D deficiency        Essential hypertension        Acute hyponatremia          Instruc Take 1 tablet (80 mg total) by mouth daily with breakfast. Take with a 20 mgm tablet to equal 100 mgm   Commonly known as:  LATUDA           Morphine Sulfate ER Beads 90 MG Cp24   Take 1 capsule (90 mg total) by mouth 2 (two) times daily.    Commonly known

## (undated) NOTE — LETTER
02/06/19        Valdo Diaz  [address]          Dear Agatha Amor,      [address] [facility] records indicate that you are due for fasting blood work (CK, AST) .   Please call our office during normal business hours so that we may schedule this appoi

## (undated) NOTE — LETTER
08/07/19      [address] [facility]      Dear [place]    Our records indicate that you have outstanding lab work and or testing that was ordered for you and has not yet been completed:  Lab Frequency Next Occurrence

## (undated) NOTE — LETTER
ASTHMA ACTION PLAN for Yovanny Garcia     : 1975     Date: 2019  Provider:  Lolly Araiza DO  Phone for doctor or clinic: [address] [place] [address], [address] [place] [id number] [phone]    ACT

## (undated) NOTE — LETTER
02/01/18        [address] [facility]      Dear Kylie Gallegos,    [address] [facility] records indicate that you have outstanding lab work and or testing that was ordered for you and has not yet been completed:  Lab Frequency Next Occurrence   VIT

## (undated) NOTE — Clinical Note
ASTHMA ACTION PLAN for Shira Blackwell     : 1975          Date: 3/28/2017    Robbie  21., [facility], Replaced by [facility], [address] [place] [id number] [phone] 1.   GREEN - GO!  % [employer] Physician Patient Caretaker (if necessary)   Rosaura DO Sean Barrios